# Patient Record
Sex: FEMALE | Race: BLACK OR AFRICAN AMERICAN | NOT HISPANIC OR LATINO | URBAN - METROPOLITAN AREA
[De-identification: names, ages, dates, MRNs, and addresses within clinical notes are randomized per-mention and may not be internally consistent; named-entity substitution may affect disease eponyms.]

---

## 2023-04-27 DIAGNOSIS — I10 PRIMARY HYPERTENSION: Primary | ICD-10-CM

## 2023-04-27 RX ORDER — PROPRANOLOL HYDROCHLORIDE 80 MG/1
80 TABLET ORAL DAILY
COMMUNITY
Start: 2020-12-05 | End: 2023-04-27 | Stop reason: SDUPTHER

## 2023-04-27 RX ORDER — PROPRANOLOL HYDROCHLORIDE 80 MG/1
80 TABLET ORAL DAILY
Qty: 30 TABLET | Refills: 0 | Status: SHIPPED | OUTPATIENT
Start: 2023-04-27

## 2023-09-01 ENCOUNTER — LAB (OUTPATIENT)
Dept: LAB | Facility: LAB | Age: 53
End: 2023-09-01
Payer: COMMERCIAL

## 2023-09-01 LAB
ALANINE AMINOTRANSFERASE (SGPT) (U/L) IN SER/PLAS: 12 U/L (ref 7–45)
ALBUMIN (G/DL) IN SER/PLAS: 4.1 G/DL (ref 3.4–5)
ALKALINE PHOSPHATASE (U/L) IN SER/PLAS: 70 U/L (ref 33–110)
ANION GAP IN SER/PLAS: 15 MMOL/L (ref 10–20)
ASPARTATE AMINOTRANSFERASE (SGOT) (U/L) IN SER/PLAS: 23 U/L (ref 9–39)
BASOPHILS (10*3/UL) IN BLOOD BY AUTOMATED COUNT: 0.02 X10E9/L (ref 0–0.1)
BASOPHILS/100 LEUKOCYTES IN BLOOD BY AUTOMATED COUNT: 0.6 % (ref 0–2)
BILIRUBIN TOTAL (MG/DL) IN SER/PLAS: 0.5 MG/DL (ref 0–1.2)
CALCIUM (MG/DL) IN SER/PLAS: 10.3 MG/DL (ref 8.6–10.6)
CARBON DIOXIDE, TOTAL (MMOL/L) IN SER/PLAS: 25 MMOL/L (ref 21–32)
CHLORIDE (MMOL/L) IN SER/PLAS: 107 MMOL/L (ref 98–107)
CHOLESTEROL (MG/DL) IN SER/PLAS: 213 MG/DL (ref 0–199)
CHOLESTEROL IN HDL (MG/DL) IN SER/PLAS: 72.2 MG/DL
CHOLESTEROL/HDL RATIO: 3
CREATININE (MG/DL) IN SER/PLAS: 1.01 MG/DL (ref 0.5–1.05)
EOSINOPHILS (10*3/UL) IN BLOOD BY AUTOMATED COUNT: 0.1 X10E9/L (ref 0–0.7)
EOSINOPHILS/100 LEUKOCYTES IN BLOOD BY AUTOMATED COUNT: 3 % (ref 0–6)
ERYTHROCYTE DISTRIBUTION WIDTH (RATIO) BY AUTOMATED COUNT: 13.7 % (ref 11.5–14.5)
ERYTHROCYTE MEAN CORPUSCULAR HEMOGLOBIN CONCENTRATION (G/DL) BY AUTOMATED: 32.2 G/DL (ref 32–36)
ERYTHROCYTE MEAN CORPUSCULAR VOLUME (FL) BY AUTOMATED COUNT: 90 FL (ref 80–100)
ERYTHROCYTES (10*6/UL) IN BLOOD BY AUTOMATED COUNT: 4.58 X10E12/L (ref 4–5.2)
ESTIMATED AVERAGE GLUCOSE FOR HBA1C: 111 MG/DL
GFR FEMALE: 67 ML/MIN/1.73M2
GLUCOSE (MG/DL) IN SER/PLAS: 85 MG/DL (ref 74–99)
HEMATOCRIT (%) IN BLOOD BY AUTOMATED COUNT: 41.3 % (ref 36–46)
HEMOGLOBIN (G/DL) IN BLOOD: 13.3 G/DL (ref 12–16)
HEMOGLOBIN A1C/HEMOGLOBIN TOTAL IN BLOOD: 5.5 %
IMMATURE GRANULOCYTES/100 LEUKOCYTES IN BLOOD BY AUTOMATED COUNT: 0.3 % (ref 0–0.9)
LDL: 121 MG/DL (ref 0–99)
LEUKOCYTES (10*3/UL) IN BLOOD BY AUTOMATED COUNT: 3.3 X10E9/L (ref 4.4–11.3)
LYMPHOCYTES (10*3/UL) IN BLOOD BY AUTOMATED COUNT: 1.98 X10E9/L (ref 1.2–4.8)
LYMPHOCYTES/100 LEUKOCYTES IN BLOOD BY AUTOMATED COUNT: 59.8 % (ref 13–44)
MAGNESIUM (MG/DL) IN SER/PLAS: 1.83 MG/DL (ref 1.6–2.4)
MONOCYTES (10*3/UL) IN BLOOD BY AUTOMATED COUNT: 0.4 X10E9/L (ref 0.1–1)
MONOCYTES/100 LEUKOCYTES IN BLOOD BY AUTOMATED COUNT: 12.1 % (ref 2–10)
NEUTROPHILS (10*3/UL) IN BLOOD BY AUTOMATED COUNT: 0.8 X10E9/L (ref 1.2–7.7)
NEUTROPHILS/100 LEUKOCYTES IN BLOOD BY AUTOMATED COUNT: 24.2 % (ref 40–80)
NRBC (PER 100 WBCS) BY AUTOMATED COUNT: 0 /100 WBC (ref 0–0)
PARATHYRIN INTACT (PG/ML) IN SER/PLAS: 176.7 PG/ML (ref 18.5–88)
PHOSPHATE (MG/DL) IN SER/PLAS: 3.6 MG/DL (ref 2.5–4.9)
PLATELETS (10*3/UL) IN BLOOD AUTOMATED COUNT: 154 X10E9/L (ref 150–450)
POC CALCIUM IONIZED (MMOL/L) IN BLOOD: 1.29 MMOL/L (ref 1.1–1.33)
POTASSIUM (MMOL/L) IN SER/PLAS: 3.9 MMOL/L (ref 3.5–5.3)
PROTEIN TOTAL: 7.2 G/DL (ref 6.4–8.2)
RBC MORPHOLOGY IN BLOOD: NORMAL
SODIUM (MMOL/L) IN SER/PLAS: 143 MMOL/L (ref 136–145)
THYROTROPIN (MIU/L) IN SER/PLAS BY DETECTION LIMIT <= 0.05 MIU/L: 0.95 MIU/L (ref 0.44–3.98)
TRIGLYCERIDE (MG/DL) IN SER/PLAS: 100 MG/DL (ref 0–149)
UREA NITROGEN (MG/DL) IN SER/PLAS: 11 MG/DL (ref 6–23)
VLDL: 20 MG/DL (ref 0–40)

## 2023-09-07 LAB — VITAMIN D 1,25-DIHYDROXY: 102 PG/ML (ref 19.9–79.3)

## 2023-09-22 NOTE — TELEPHONE ENCOUNTER
Rep from [facility] requesting eliquis 2.5mg, 90 day supply. Order pended and routed to provider.

## 2023-10-03 ENCOUNTER — TELEPHONE (OUTPATIENT)
Dept: PRIMARY CARE | Facility: CLINIC | Age: 53
End: 2023-10-03
Payer: COMMERCIAL

## 2023-10-03 NOTE — TELEPHONE ENCOUNTER
Call placed to pt to inform need to schedule appt to establish care with a provider for refills d/t not being seen since 2021 in .

## 2023-10-31 PROBLEM — H02.889 MGD (MEIBOMIAN GLAND DISEASE): Status: ACTIVE | Noted: 2023-10-31

## 2023-10-31 PROBLEM — N94.89 ADNEXAL MASS: Status: ACTIVE | Noted: 2023-10-31

## 2023-10-31 PROBLEM — N83.00 OVARIAN FOLLICULAR CYST: Status: ACTIVE | Noted: 2023-10-31

## 2023-10-31 PROBLEM — F41.9 INSOMNIA SECONDARY TO ANXIETY: Status: ACTIVE | Noted: 2021-06-10

## 2023-10-31 PROBLEM — G89.29 CHRONIC PELVIC PAIN IN FEMALE: Status: ACTIVE | Noted: 2023-10-31

## 2023-10-31 PROBLEM — H25.13 AGE-RELATED NUCLEAR CATARACT OF BOTH EYES: Status: ACTIVE | Noted: 2023-10-31

## 2023-10-31 PROBLEM — G43.119 INTRACTABLE MIGRAINE WITH AURA WITHOUT STATUS MIGRAINOSUS: Status: ACTIVE | Noted: 2023-10-31

## 2023-10-31 PROBLEM — I10 ESSENTIAL HYPERTENSION: Status: ACTIVE | Noted: 2023-10-31

## 2023-10-31 PROBLEM — N93.9 ABNORMAL UTERINE BLEEDING: Status: ACTIVE | Noted: 2023-10-31

## 2023-10-31 PROBLEM — D48.5 NEOPLASM OF UNCERTAIN BEHAVIOR OF SKIN: Status: ACTIVE | Noted: 2020-11-02

## 2023-10-31 PROBLEM — Z30.9 CONTRACEPTIVE MANAGEMENT: Status: ACTIVE | Noted: 2023-10-31

## 2023-10-31 PROBLEM — G89.29 BACK PAIN, CHRONIC: Status: ACTIVE | Noted: 2023-10-31

## 2023-10-31 PROBLEM — B00.9 HERPES: Status: ACTIVE | Noted: 2023-10-31

## 2023-10-31 PROBLEM — Z86.59 HISTORY OF POSTTRAUMATIC STRESS DISORDER (PTSD): Status: ACTIVE | Noted: 2023-04-25

## 2023-10-31 PROBLEM — E21.3 HYPERPARATHYROIDISM (MULTI): Status: ACTIVE | Noted: 2023-10-31

## 2023-10-31 PROBLEM — M79.7 FIBROMYALGIA: Status: ACTIVE | Noted: 2023-10-31

## 2023-10-31 PROBLEM — F41.1 GENERALIZED ANXIETY DISORDER: Status: ACTIVE | Noted: 2021-05-06

## 2023-10-31 PROBLEM — D68.61 ANTIPHOSPHOLIPID ANTIBODY SYNDROME (MULTI): Status: ACTIVE | Noted: 2023-10-31

## 2023-10-31 PROBLEM — E55.9 VITAMIN D DEFICIENCY: Status: ACTIVE | Noted: 2023-10-31

## 2023-10-31 PROBLEM — D25.9 FIBROID UTERUS: Status: ACTIVE | Noted: 2023-10-31

## 2023-10-31 PROBLEM — F40.10 SOCIAL ANXIETY DISORDER: Status: ACTIVE | Noted: 2023-07-18

## 2023-10-31 PROBLEM — H52.02 HYPEROPIA WITH PRESBYOPIA OF LEFT EYE: Status: ACTIVE | Noted: 2023-10-31

## 2023-10-31 PROBLEM — H52.4 HYPEROPIA WITH REGULAR ASTIGMATISM AND PRESBYOPIA, BILATERAL: Status: ACTIVE | Noted: 2023-10-31

## 2023-10-31 PROBLEM — D25.9 UTERINE LEIOMYOMA: Status: ACTIVE | Noted: 2023-10-31

## 2023-10-31 PROBLEM — E66.812 OBESITY, CLASS II, BMI 35-39.9: Status: ACTIVE | Noted: 2019-02-05

## 2023-10-31 PROBLEM — N91.2 AMENORRHEA, UNSPECIFIED: Status: ACTIVE | Noted: 2023-10-31

## 2023-10-31 PROBLEM — H40.039 ANATOMICAL NARROW ANGLE: Status: ACTIVE | Noted: 2023-10-31

## 2023-10-31 PROBLEM — H52.03 HYPEROPIA WITH REGULAR ASTIGMATISM AND PRESBYOPIA, BILATERAL: Status: ACTIVE | Noted: 2023-10-31

## 2023-10-31 PROBLEM — H04.129 DRY EYE SYNDROME: Status: ACTIVE | Noted: 2023-10-31

## 2023-10-31 PROBLEM — M62.89 PELVIC FLOOR DYSFUNCTION: Status: ACTIVE | Noted: 2023-10-31

## 2023-10-31 PROBLEM — M32.9 LUPUS (SYSTEMIC LUPUS ERYTHEMATOSUS) (MULTI): Status: ACTIVE | Noted: 2023-10-31

## 2023-10-31 PROBLEM — E66.9 OBESITY, CLASS II, BMI 35-39.9: Status: ACTIVE | Noted: 2019-02-05

## 2023-10-31 PROBLEM — F33.0 MILD EPISODE OF RECURRENT MAJOR DEPRESSIVE DISORDER (CMS-HCC): Chronic | Status: ACTIVE | Noted: 2021-05-06

## 2023-10-31 PROBLEM — H52.03 HYPEROPIA OF BOTH EYES: Status: ACTIVE | Noted: 2023-10-31

## 2023-10-31 PROBLEM — H52.4 HYPEROPIA WITH PRESBYOPIA OF LEFT EYE: Status: ACTIVE | Noted: 2023-10-31

## 2023-10-31 PROBLEM — R10.84 GENERALIZED ABDOMINAL PAIN: Status: ACTIVE | Noted: 2023-10-31

## 2023-10-31 PROBLEM — M32.9 SYSTEMIC LUPUS ERYTHEMATOSUS (MULTI): Status: ACTIVE | Noted: 2023-10-31

## 2023-10-31 PROBLEM — M54.9 BACK PAIN, CHRONIC: Status: ACTIVE | Noted: 2023-10-31

## 2023-10-31 PROBLEM — F32.A DEPRESSION: Status: ACTIVE | Noted: 2023-10-31

## 2023-10-31 PROBLEM — H52.223 HYPEROPIA WITH REGULAR ASTIGMATISM AND PRESBYOPIA, BILATERAL: Status: ACTIVE | Noted: 2023-10-31

## 2023-10-31 PROBLEM — E83.52 SERUM CALCIUM ELEVATED: Status: ACTIVE | Noted: 2023-10-31

## 2023-10-31 PROBLEM — R87.610 PAP SMEAR ABNORMALITY OF CERVIX WITH ASCUS FAVORING BENIGN: Status: ACTIVE | Noted: 2023-10-31

## 2023-10-31 PROBLEM — G47.00 INSOMNIA: Status: ACTIVE | Noted: 2023-10-31

## 2023-10-31 PROBLEM — R10.2 CHRONIC PELVIC PAIN IN FEMALE: Status: ACTIVE | Noted: 2023-10-31

## 2023-10-31 PROBLEM — E66.9 OBESITY: Status: ACTIVE | Noted: 2023-10-31

## 2023-10-31 PROBLEM — N92.3 INTERMENSTRUAL BLEEDING: Status: ACTIVE | Noted: 2023-10-31

## 2023-10-31 PROBLEM — R80.9 PROTEINURIA: Status: ACTIVE | Noted: 2023-10-31

## 2023-10-31 PROBLEM — F51.05 INSOMNIA SECONDARY TO ANXIETY: Status: ACTIVE | Noted: 2021-06-10

## 2023-10-31 RX ORDER — HYDROXYZINE HYDROCHLORIDE 25 MG/1
1 TABLET, FILM COATED ORAL EVERY 12 HOURS PRN
COMMUNITY
Start: 2021-03-05

## 2023-10-31 RX ORDER — ONDANSETRON 4 MG/1
1 TABLET, FILM COATED ORAL EVERY 8 HOURS PRN
COMMUNITY
Start: 2016-12-28

## 2023-10-31 RX ORDER — HYDROXYCHLOROQUINE SULFATE 200 MG/1
200 TABLET, FILM COATED ORAL DAILY
COMMUNITY
End: 2024-05-01 | Stop reason: SDUPTHER

## 2023-10-31 RX ORDER — PREDNISONE 1 MG/1
2 TABLET ORAL
COMMUNITY
Start: 2017-07-27

## 2023-10-31 RX ORDER — LOSARTAN POTASSIUM 25 MG/1
1 TABLET ORAL DAILY
COMMUNITY
Start: 2016-08-12

## 2023-10-31 RX ORDER — MULTIVITAMIN
TABLET ORAL
COMMUNITY
Start: 2023-09-08

## 2023-10-31 RX ORDER — OMEPRAZOLE 20 MG/1
1 CAPSULE, DELAYED RELEASE ORAL DAILY
COMMUNITY
Start: 2017-08-15 | End: 2024-04-09 | Stop reason: SDUPTHER

## 2023-10-31 RX ORDER — CYCLOBENZAPRINE HCL 10 MG
1 TABLET ORAL NIGHTLY PRN
COMMUNITY
Start: 2018-03-28

## 2023-10-31 RX ORDER — MULTIVITAMIN/IRON/FOLIC ACID 18MG-0.4MG
1 TABLET ORAL DAILY
COMMUNITY
Start: 2021-03-05

## 2023-10-31 RX ORDER — ERGOCALCIFEROL 1.25 MG/1
1 CAPSULE ORAL
COMMUNITY
Start: 2022-03-03

## 2023-10-31 RX ORDER — ESCITALOPRAM OXALATE 10 MG/1
TABLET ORAL
COMMUNITY
Start: 2023-03-14

## 2023-10-31 RX ORDER — ARIPIPRAZOLE 10 MG/1
1 TABLET ORAL DAILY
COMMUNITY
Start: 2023-09-15

## 2023-10-31 RX ORDER — CITALOPRAM 40 MG/1
1 TABLET, FILM COATED ORAL DAILY
COMMUNITY
Start: 2013-10-08

## 2023-10-31 RX ORDER — DEXTROMETHORPHAN HYDROBROMIDE, GUAIFENESIN 5; 100 MG/5ML; MG/5ML
1 LIQUID ORAL
COMMUNITY
Start: 2018-09-24

## 2023-10-31 RX ORDER — CYCLOBENZAPRINE HCL 5 MG
1 TABLET ORAL DAILY PRN
COMMUNITY

## 2023-10-31 RX ORDER — TALC
2 POWDER (GRAM) TOPICAL NIGHTLY
COMMUNITY
Start: 2023-08-18

## 2023-10-31 RX ORDER — UBIDECARENONE 30 MG
1 CAPSULE ORAL DAILY
COMMUNITY
Start: 2018-06-09

## 2023-10-31 RX ORDER — ACETAMINOPHEN 325 MG/1
1-2 TABLET ORAL EVERY 4 HOURS PRN
COMMUNITY
Start: 2016-04-11

## 2023-10-31 RX ORDER — NORETHINDRONE 0.35 MG/1
1 TABLET ORAL DAILY
COMMUNITY

## 2023-10-31 RX ORDER — CITALOPRAM 20 MG/1
1 TABLET, FILM COATED ORAL DAILY
COMMUNITY
Start: 2019-02-05

## 2023-11-01 ENCOUNTER — OFFICE VISIT (OUTPATIENT)
Dept: OBSTETRICS AND GYNECOLOGY | Facility: HOSPITAL | Age: 53
End: 2023-11-01
Payer: COMMERCIAL

## 2023-11-01 VITALS
WEIGHT: 229 LBS | SYSTOLIC BLOOD PRESSURE: 120 MMHG | HEART RATE: 78 BPM | DIASTOLIC BLOOD PRESSURE: 79 MMHG | BODY MASS INDEX: 40.57 KG/M2 | HEIGHT: 63 IN

## 2023-11-01 DIAGNOSIS — Z01.419 WELL WOMAN EXAM: Primary | ICD-10-CM

## 2023-11-01 PROCEDURE — 99386 PREV VISIT NEW AGE 40-64: CPT

## 2023-11-01 PROCEDURE — 3074F SYST BP LT 130 MM HG: CPT

## 2023-11-01 PROCEDURE — 1036F TOBACCO NON-USER: CPT

## 2023-11-01 PROCEDURE — 3078F DIAST BP <80 MM HG: CPT

## 2023-11-01 SDOH — ECONOMIC STABILITY: FOOD INSECURITY: WITHIN THE PAST 12 MONTHS, THE FOOD YOU BOUGHT JUST DIDN'T LAST AND YOU DIDN'T HAVE MONEY TO GET MORE.: NEVER TRUE

## 2023-11-01 SDOH — ECONOMIC STABILITY: FOOD INSECURITY: WITHIN THE PAST 12 MONTHS, YOU WORRIED THAT YOUR FOOD WOULD RUN OUT BEFORE YOU GOT MONEY TO BUY MORE.: NEVER TRUE

## 2023-11-01 ASSESSMENT — PATIENT HEALTH QUESTIONNAIRE - PHQ9
SUM OF ALL RESPONSES TO PHQ9 QUESTIONS 1 & 2: 0
1. LITTLE INTEREST OR PLEASURE IN DOING THINGS: NOT AT ALL
2. FEELING DOWN, DEPRESSED OR HOPELESS: NOT AT ALL

## 2023-11-01 ASSESSMENT — ENCOUNTER SYMPTOMS
CONSTITUTIONAL NEGATIVE: 0
NEUROLOGICAL NEGATIVE: 0
MUSCULOSKELETAL NEGATIVE: 0
ALLERGIC/IMMUNOLOGIC NEGATIVE: 0
RESPIRATORY NEGATIVE: 0
GASTROINTESTINAL NEGATIVE: 0
HEMATOLOGIC/LYMPHATIC NEGATIVE: 0
CARDIOVASCULAR NEGATIVE: 0
PSYCHIATRIC NEGATIVE: 0
EYES NEGATIVE: 0
ENDOCRINE NEGATIVE: 0

## 2023-11-01 NOTE — PROGRESS NOTES
"Assessment/Plan   Problem List Items Addressed This Visit             ICD-10-CM    Well woman exam - Primary Z01.419     Well woman exam completed; PAP due in 2025         Relevant Orders    Follow Up In Gynecology       LEATHA Guerrero   Desire Dailey is a 53 y.o. female who presents for annual exam.     Concerns today:  No concerns    GYN screening history: last pap: approximate date 10/2020 and was normal and last mammogram: approximate date 9/2023 and was normal. The patient is not taking hormone replacement therapy. Patient denies post-menopausal vaginal bleeding..  The patient participates in regular exercise: not asked. The patient reports that there is not domestic violence in their life.     Last pap: 2020  History of abnormal Pap smear: no  Family history of uterine or ovarian cancer: no    Last mammogram: 2023  History of abnormal mammogram: no  Family history of breast cancer: yes - sister    Menstrual History:  OB History    No obstetric history on file.          No LMP recorded (lmp unknown). Patient is perimenopausal.         Review of Systems   All other systems reviewed and are negative.       Objective   /79 (BP Location: Right arm, Patient Position: Sitting, BP Cuff Size: Adult)   Pulse 78   Ht 1.6 m (5' 3\")   Wt 104 kg (229 lb)   LMP  (LMP Unknown) Comment: 3 years  BMI 40.57 kg/m²     Physical Exam  Constitutional:       Appearance: Normal appearance.   HENT:      Head: Normocephalic and atraumatic.      Mouth/Throat:      Mouth: Mucous membranes are moist.   Cardiovascular:      Rate and Rhythm: Normal rate and regular rhythm.      Heart sounds: Normal heart sounds.   Pulmonary:      Effort: Pulmonary effort is normal.      Breath sounds: Normal breath sounds.   Abdominal:      General: Abdomen is flat.      Palpations: Abdomen is soft.   Musculoskeletal:         General: Normal range of motion.      Cervical back: Normal range of motion and neck supple. "   Skin:     General: Skin is warm and dry.   Neurological:      Mental Status: She is alert and oriented to person, place, and time.   Psychiatric:         Mood and Affect: Mood normal.         Behavior: Behavior normal.         Thought Content: Thought content normal.         Judgment: Judgment normal.

## 2023-11-28 ENCOUNTER — OFFICE VISIT (OUTPATIENT)
Dept: PRIMARY CARE | Facility: CLINIC | Age: 53
End: 2023-11-28
Payer: COMMERCIAL

## 2023-11-28 VITALS
TEMPERATURE: 98.2 F | HEIGHT: 63 IN | HEART RATE: 99 BPM | RESPIRATION RATE: 18 BRPM | WEIGHT: 226.3 LBS | BODY MASS INDEX: 40.1 KG/M2 | SYSTOLIC BLOOD PRESSURE: 115 MMHG | DIASTOLIC BLOOD PRESSURE: 84 MMHG | OXYGEN SATURATION: 97 %

## 2023-11-28 DIAGNOSIS — Z86.718 HISTORY OF DEEP VEIN THROMBOSIS: Primary | ICD-10-CM

## 2023-11-28 PROCEDURE — 99396 PREV VISIT EST AGE 40-64: CPT | Performed by: STUDENT IN AN ORGANIZED HEALTH CARE EDUCATION/TRAINING PROGRAM

## 2023-11-28 PROCEDURE — 1036F TOBACCO NON-USER: CPT | Performed by: STUDENT IN AN ORGANIZED HEALTH CARE EDUCATION/TRAINING PROGRAM

## 2023-11-28 PROCEDURE — 3074F SYST BP LT 130 MM HG: CPT | Performed by: STUDENT IN AN ORGANIZED HEALTH CARE EDUCATION/TRAINING PROGRAM

## 2023-11-28 PROCEDURE — 3079F DIAST BP 80-89 MM HG: CPT | Performed by: STUDENT IN AN ORGANIZED HEALTH CARE EDUCATION/TRAINING PROGRAM

## 2023-11-28 ASSESSMENT — PAIN SCALES - GENERAL: PAINLEVEL: 0-NO PAIN

## 2023-11-28 NOTE — PROGRESS NOTES
I reviewed the resident/fellow's documentation and discussed the patient with the resident/fellow. I agree with the resident/fellow's medical decision making as documented in the note.    Kerwin Harrington MD
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
## 2023-12-22 ENCOUNTER — TELEPHONE (OUTPATIENT)
Dept: PRIMARY CARE | Facility: CLINIC | Age: 53
End: 2023-12-22
Payer: COMMERCIAL

## 2023-12-27 ENCOUNTER — TELEPHONE (OUTPATIENT)
Dept: RHEUMATOLOGY | Facility: CLINIC | Age: 53
End: 2023-12-27
Payer: COMMERCIAL

## 2023-12-27 DIAGNOSIS — R79.89 ELEVATED PTHRP LEVEL: ICD-10-CM

## 2023-12-27 DIAGNOSIS — E55.9 VITAMIN D DEFICIENCY: Primary | ICD-10-CM

## 2023-12-27 NOTE — TELEPHONE ENCOUNTER
Pt called stating the last time she saw you she was given a referral. Unsure of what the referral is for but she has misplaced it and would like a new one mailed. I was unable to find a referral in her chart.

## 2024-01-11 ENCOUNTER — LAB (OUTPATIENT)
Dept: LAB | Facility: LAB | Age: 54
End: 2024-01-11
Payer: COMMERCIAL

## 2024-01-11 DIAGNOSIS — E55.9 VITAMIN D DEFICIENCY: ICD-10-CM

## 2024-01-11 DIAGNOSIS — R79.89 ELEVATED PTHRP LEVEL: ICD-10-CM

## 2024-01-11 LAB
25(OH)D3 SERPL-MCNC: 36 NG/ML (ref 30–100)
PTH-INTACT SERPL-MCNC: 174.6 PG/ML (ref 18.5–88)

## 2024-01-11 PROCEDURE — 82306 VITAMIN D 25 HYDROXY: CPT

## 2024-01-11 PROCEDURE — 83970 ASSAY OF PARATHORMONE: CPT

## 2024-01-11 PROCEDURE — 36415 COLL VENOUS BLD VENIPUNCTURE: CPT

## 2024-01-12 DIAGNOSIS — E21.3 HYPERPARATHYROIDISM (MULTI): Primary | ICD-10-CM

## 2024-01-12 DIAGNOSIS — E66.01 CLASS 3 SEVERE OBESITY DUE TO EXCESS CALORIES WITH SERIOUS COMORBIDITY AND BODY MASS INDEX (BMI) OF 40.0 TO 44.9 IN ADULT (MULTI): Primary | ICD-10-CM

## 2024-02-08 NOTE — PROGRESS NOTES
Subjective   Patient ID: Desire Dailey is a 53 y.o. female who presents for Lupus (FUV- pt states has increased pain level and has notice some hairloss. Taking Plaquenil 200 mg Bid.).    HPI  Last seen one year ago 2/3/2023  Dr. Frances former Rheum     · Hyperparathyroidism (252.00) (E21.3)   · Antiphospholipid antibody syndrome (289.81) (D68.61)   · Lupus (systemic lupus erythematosus) (710.0) (M32.9)   · Long-term use of Plaquenil (V58.69) (Z79.899)    Referral to Endo last year never went    Eye exam done 2023      Current Outpatient Medications   Medication Sig Dispense Refill    acetaminophen (Tylenol 8 HOUR) 650 mg ER tablet Take 1 tablet (650 mg) by mouth. 3-4 TIMES DAILY      apixaban (Eliquis) 2.5 mg tablet Take 1 tablet (2.5 mg) by mouth 2 times a day. 120 tablet 3    ARIPiprazole (Abilify) 10 mg tablet Take 1 tablet (10 mg) by mouth once daily.      Certavite-Antioxidant  mg-mcg tablet Take 1 tablet by mouth once daily.      citalopram (CeleXA) 40 mg tablet Take 1 tablet (40 mg) by mouth once daily.      escitalopram (Lexapro) 10 mg tablet       hydroxychloroquine (Plaquenil) 200 mg tablet Take 1 tablet (200 mg) by mouth once daily. Takes one tablet in morning and one tablet at night.      hydrOXYzine HCL (Atarax) 25 mg tablet Take 1 tablet (25 mg) by mouth every 12 hours if needed.      losartan (Cozaar) 25 mg tablet Take 1 tablet (25 mg) by mouth once daily.      melatonin 3 mg tablet Take 2 tablets (6 mg) by mouth once daily at bedtime.      omeprazole (PriLOSEC) 20 mg DR capsule Take 1 capsule (20 mg) by mouth once daily.      ondansetron (Zofran) 4 mg tablet Take 1 tablet (4 mg) by mouth every 8 hours if needed for nausea.      propranolol (Inderal) 80 mg tablet Take 1 tablet (80 mg) by mouth once daily. 30 tablet 0    acetaminophen (TylenoL) 325 mg tablet Take 1-2 tablets (325-650 mg) by mouth every 4 hours if needed.      citalopram (CeleXA) 20 mg tablet Take 1 tablet (20 mg) by mouth once  daily. Takes 30 mg at night      cyclobenzaprine (Flexeril) 10 mg tablet Take 1 tablet (10 mg) by mouth as needed at bedtime for muscle spasms.      cyclobenzaprine (Flexeril) 5 mg tablet Take 1 tablet (5 mg) by mouth once daily as needed.      ergocalciferol (Vitamin D-2) 1.25 MG (98363 UT) capsule Take 1 capsule (1,250 mcg) by mouth 1 (one) time per week. X 3 WEEKS THEN ONCE EVERY OTHER WEEK      multivitamin tablet       mv-calcium-min-iron fm-FA-vitK (Multi For Her) 18 mg iron-600 mcg-80 mcg tablet Take 1 tablet by mouth once daily.      norethindrone (Lia) 0.35 mg tablet Take 1 tablet (0.35 mg) by mouth once daily.      predniSONE (Deltasone) 1 mg tablet Take 2 tablets (2 mg) by mouth once daily in the morning. Take before meals.      predniSONE 5 mg tablet,delayed release (DR/EC) Take 1 tablet by mouth once daily.      RANITIDINE HCL ORAL Take by mouth once daily in the morning.       No current facility-administered medications for this visit.         has a past medical history of Acute embolism and thrombosis of unspecified deep veins of unspecified lower extremity (CMS/[facility]) (08/25/2014), Acute embolism and thrombosis of unspecified deep veins of unspecified lower extremity (CMS/[facility]) (07/08/2020), Dry eye syndrome of unspecified lacrimal gland (08/03/2017), Encounter for screening for infections with a predominantly sexual mode of transmission (10/06/2020), Injury of conjunctiva and corneal abrasion without foreign body, left eye, initial encounter (08/24/2015), Long term (current) use of anticoagulants (12/16/2014), Meibomian gland dysfunction of unspecified eye, unspecified eyelid (08/03/2017), Other long term (current) drug therapy (03/02/2022), Personal history of other complications of pregnancy, childbirth and the puerperium, Personal history of other specified conditions, Systemic lupus erythematosus, unspecified (CMS/[facility]) (03/28/2022), Unspecified cataract (05/12/2015), Unspecified cataract  (05/12/2015), Unspecified cataract (05/12/2015), and Unspecified cataract (05/12/2015).   Past Surgical History:   Procedure Laterality Date    FOOT SURGERY  06/02/2016    Foot Surgery Right    IR ANGIOGRAM INFERIOR EPIGASTRIC  12/27/2016    IR ANGIOGRAM INFERIOR EPIGASTRIC 12/27/2016 [facility]    IR ANGIOGRAM INFERIOR EPIGASTRIC  12/27/2016    IR ANGIOGRAM INFERIOR EPIGASTRIC 12/27/2016 [facility]    OTHER SURGICAL HISTORY  01/18/2019    Laparotomy    OTHER SURGICAL HISTORY  06/02/2016    Laparoscopy With Excision Of Ectopic Pregnancy      Social History     Tobacco Use    Smoking status: Never    Smokeless tobacco: Never   Vaping Use    Vaping Use: Never used   Substance Use Topics    Alcohol use: Never      family history includes Angina in her sister; Cerebral aneurysm in her mother; Depression in her mother; Diabetes in her father, mother's sister, and sister; Heart attack in her mother; Hyperlipidemia in her mother; Hypertension in her mother, mother's sister, and sister; Migraines in her mother; Pancreatic cancer in her father; Stroke in her mother and sister; Uterine cancer in her sister.  Pain Management Panel           No data to display                 There were no vitals filed for this visit.  Lab Results   Component Value Date    TSH 0.95 09/01/2023       PHYSICAL EXAM    Some increased   NODES   HEART-  LUNGS-  ABDOMEN   VASCULAR  NEURO -  SKIN-  JOINTS -    PLAN  Diagnoses and all orders for this visit:  Antiphospholipid antibody syndrome (CMS/HCC)  Hyperparathyroidism (CMS/HCC)  Vitamin D deficiency  Systemic lupus erythematosus, unspecified SLE type, unspecified organ involvement status (CMS/HCC)    Patient is doing very well  Some minimal hair loss  Some muscle pain but not joint pain she has had this before and is responding to Flexeril as needed    Lab work continues to show elevated PTH with recent vitamin D at 36.  I will try again to refer to endocrinology if she did not keep her  appointment last year    She has a new PCP at [facility] although it is not listed on epic.  She had recent blood work in January 2024.  She does not need any blood work regarding the conditions that I see her for.  I will see her back in 1 year

## 2024-02-09 ENCOUNTER — OFFICE VISIT (OUTPATIENT)
Dept: RHEUMATOLOGY | Facility: CLINIC | Age: 54
End: 2024-02-09
Payer: COMMERCIAL

## 2024-02-09 VITALS
DIASTOLIC BLOOD PRESSURE: 87 MMHG | WEIGHT: 227 LBS | BODY MASS INDEX: 40.21 KG/M2 | HEART RATE: 53 BPM | SYSTOLIC BLOOD PRESSURE: 123 MMHG

## 2024-02-09 DIAGNOSIS — M79.10 MYALGIA: ICD-10-CM

## 2024-02-09 DIAGNOSIS — M32.9 SYSTEMIC LUPUS ERYTHEMATOSUS, UNSPECIFIED SLE TYPE, UNSPECIFIED ORGAN INVOLVEMENT STATUS (MULTI): ICD-10-CM

## 2024-02-09 DIAGNOSIS — E55.9 VITAMIN D DEFICIENCY: ICD-10-CM

## 2024-02-09 DIAGNOSIS — E21.3 HYPERPARATHYROIDISM (MULTI): ICD-10-CM

## 2024-02-09 DIAGNOSIS — D68.61 ANTIPHOSPHOLIPID ANTIBODY SYNDROME (MULTI): Primary | ICD-10-CM

## 2024-02-09 PROCEDURE — 1036F TOBACCO NON-USER: CPT | Performed by: INTERNAL MEDICINE

## 2024-02-09 PROCEDURE — 3008F BODY MASS INDEX DOCD: CPT | Performed by: INTERNAL MEDICINE

## 2024-02-09 PROCEDURE — 3074F SYST BP LT 130 MM HG: CPT | Performed by: INTERNAL MEDICINE

## 2024-02-09 PROCEDURE — 3079F DIAST BP 80-89 MM HG: CPT | Performed by: INTERNAL MEDICINE

## 2024-02-09 PROCEDURE — 99214 OFFICE O/P EST MOD 30 MIN: CPT | Performed by: INTERNAL MEDICINE

## 2024-02-09 RX ORDER — CYCLOBENZAPRINE HCL 10 MG
10 TABLET ORAL NIGHTLY
Qty: 30 TABLET | Refills: 0 | Status: SHIPPED | OUTPATIENT
Start: 2024-02-09 | End: 2024-03-10

## 2024-02-27 ENCOUNTER — APPOINTMENT (OUTPATIENT)
Dept: OPHTHALMOLOGY | Facility: CLINIC | Age: 54
End: 2024-02-27
Payer: COMMERCIAL

## 2024-03-11 ENCOUNTER — OFFICE VISIT (OUTPATIENT)
Dept: OPHTHALMOLOGY | Facility: CLINIC | Age: 54
End: 2024-03-11
Payer: COMMERCIAL

## 2024-03-11 DIAGNOSIS — H02.88B MEIBOMIAN GLAND DYSFUNCTION LEFT EYE, UPPER AND LOWER EYELIDS: ICD-10-CM

## 2024-03-11 DIAGNOSIS — H52.223 REGULAR ASTIGMATISM OF BOTH EYES: ICD-10-CM

## 2024-03-11 DIAGNOSIS — D68.61 ANTIPHOSPHOLIPID ANTIBODY SYNDROME (MULTI): ICD-10-CM

## 2024-03-11 DIAGNOSIS — H52.4 PRESBYOPIA: ICD-10-CM

## 2024-03-11 DIAGNOSIS — H52.03 HYPEROPIA OF BOTH EYES: Primary | ICD-10-CM

## 2024-03-11 DIAGNOSIS — H40.039 ANATOMICAL NARROW ANGLE: ICD-10-CM

## 2024-03-11 DIAGNOSIS — Z79.899 LONG-TERM USE OF PLAQUENIL: ICD-10-CM

## 2024-03-11 DIAGNOSIS — M32.14 SLE GLOMERULONEPHRITIS SYNDROME (MULTI): Chronic | ICD-10-CM

## 2024-03-11 DIAGNOSIS — H02.88A MEIBOMIAN GLAND DYSFUNCTION RIGHT EYE, UPPER AND LOWER EYELIDS: ICD-10-CM

## 2024-03-11 PROCEDURE — 99203 OFFICE O/P NEW LOW 30 MIN: CPT | Performed by: OPTOMETRIST

## 2024-03-11 PROCEDURE — 92020 GONIOSCOPY: CPT | Performed by: OPTOMETRIST

## 2024-03-11 PROCEDURE — 92134 CPTRZ OPH DX IMG PST SGM RTA: CPT | Mod: BILATERAL PROCEDURE | Performed by: OPTOMETRIST

## 2024-03-11 PROCEDURE — 92015 DETERMINE REFRACTIVE STATE: CPT | Performed by: OPTOMETRIST

## 2024-03-11 PROCEDURE — 92083 EXTENDED VISUAL FIELD XM: CPT | Performed by: OPTOMETRIST

## 2024-03-11 ASSESSMENT — VISUAL ACUITY
METHOD: SNELLEN - LINEAR
OS_SC: 20/40-2
OD_SC: 20/40

## 2024-03-11 ASSESSMENT — CONF VISUAL FIELD
OD_NORMAL: 1
OS_INFERIOR_TEMPORAL_RESTRICTION: 0
OD_SUPERIOR_TEMPORAL_RESTRICTION: 0
OD_SUPERIOR_NASAL_RESTRICTION: 0
OS_INFERIOR_NASAL_RESTRICTION: 0
OD_INFERIOR_TEMPORAL_RESTRICTION: 0
OS_SUPERIOR_NASAL_RESTRICTION: 0
OS_SUPERIOR_TEMPORAL_RESTRICTION: 0
OD_INFERIOR_NASAL_RESTRICTION: 0
METHOD: COUNTING FINGERS
OS_NORMAL: 1

## 2024-03-11 ASSESSMENT — ENCOUNTER SYMPTOMS
MUSCULOSKELETAL NEGATIVE: 0
CONSTITUTIONAL NEGATIVE: 0
RESPIRATORY NEGATIVE: 0
PSYCHIATRIC NEGATIVE: 0
NEUROLOGICAL NEGATIVE: 0
EYES NEGATIVE: 1
HEMATOLOGIC/LYMPHATIC NEGATIVE: 0
ENDOCRINE NEGATIVE: 0
CARDIOVASCULAR NEGATIVE: 0
ALLERGIC/IMMUNOLOGIC NEGATIVE: 0
GASTROINTESTINAL NEGATIVE: 0

## 2024-03-11 ASSESSMENT — CUP TO DISC RATIO
OS_RATIO: .4
OD_RATIO: .4

## 2024-03-11 ASSESSMENT — REFRACTION_MANIFEST
OS_AXIS: 010
OS_CYLINDER: -0.50
OS_ADD: +2.25
OD_SPHERE: +1.25
OD_CYLINDER: -0.50
OS_SPHERE: +1.25
OD_AXIS: 070
OD_ADD: +2.25

## 2024-03-11 ASSESSMENT — GONIOSCOPY
OS_INFERIOR: GR III
METHOD: 4 MIRROR
OS_SUPERIOR: GR II
OD_NASAL: GR II
OS_TEMPORAL: GR II
OD_SUPERIOR: GR II
OS_NASAL: GR II
OD_TEMPORAL: GR II
OD_INFERIOR: GR III

## 2024-03-11 ASSESSMENT — REFRACTION
OD_CYLINDER: -0.50
OD_SPHERE: +1.50
OS_AXIS: 100
OS_CYLINDER: -0.50
OS_ADD: +2.25
OD_AXIS: 080
OS_SPHERE: +1.50
OD_ADD: +2.25

## 2024-03-11 ASSESSMENT — EXTERNAL EXAM - LEFT EYE: OS_EXAM: NORMAL

## 2024-03-11 ASSESSMENT — TONOMETRY
IOP_METHOD: GOLDMANN APPLANATION
OD_IOP_MMHG: 11
OS_IOP_MMHG: 12

## 2024-03-11 ASSESSMENT — SLIT LAMP EXAM - LIDS
COMMENTS: GOOD POSITION, 3+ MGD
COMMENTS: GOOD POSITION, 3+ MGD

## 2024-03-11 ASSESSMENT — EXTERNAL EXAM - RIGHT EYE: OD_EXAM: NORMAL

## 2024-03-11 NOTE — PROGRESS NOTES
Assessment/Plan   Diagnoses and all orders for this visit:  Hyperopia of both eyes  Presbyopia  Regular astigmatism of both eyes  New spec rx released today per patient request. Ocular health wnl for age OU. Monitor 1 year or sooner prn. Refraction billed today. Discussed CLs, need to treat dry eye first. $90 eval fee, new wearer, rtc prn.    Long-term use of Plaquenil  -     OCT, Retina - OU - Both Eyes  -     [place] Visual Field - OU - Both Eyes  SLE glomerulonephritis syndrome (CMS/HCC)  Studies reveal that the risk of maculopathy when taking Plaquenil is 0% (0-5 years), 1% (over 5 years), 2% (over 10 years), and 20% cumulative, or 4% annual incidence (over 20 years) respectively.  Annual testing with 10-2 threshold visual field perimetry, as well as spectral domain optical coherence tomography of the macula is recommended. No signs of plaquenil toxicity today od/os, macula OCT today, HVF 10-2 today  monitor 1 year or sooner with any acute vision change. HVF 10-2 and OCT Mac at next comprehensive exam.    Antiphospholipid antibody syndrome (CMS/[facility])  No evidence of intraocular inflammation. Monitor.     Anatomical narrow angle  Gonio reveals open to gr III inf, gr II in all other quadrants    Meibomian gland dysfunction  Recommend hot compresses with lid massage bid OU. Recommend lid cleansing qhs OU. Discussed that this is a chronic problem which requires chronic/consistent treatment.

## 2024-04-09 DIAGNOSIS — K21.9 GASTROESOPHAGEAL REFLUX DISEASE WITHOUT ESOPHAGITIS: Primary | ICD-10-CM

## 2024-04-09 RX ORDER — OMEPRAZOLE 20 MG/1
20 CAPSULE, DELAYED RELEASE ORAL DAILY
Qty: 90 CAPSULE | Refills: 3 | Status: SHIPPED | OUTPATIENT
Start: 2024-04-09 | End: 2024-04-23 | Stop reason: SDUPTHER

## 2024-04-09 NOTE — TELEPHONE ENCOUNTER
Hakan, pharm tech of [facility] called requesting refill of pt omeprazole DR 20mg. Order pended and routed to provider.

## 2024-04-23 ENCOUNTER — TELEPHONE (OUTPATIENT)
Dept: PRIMARY CARE | Facility: CLINIC | Age: 54
End: 2024-04-23

## 2024-04-23 DIAGNOSIS — K21.9 GASTROESOPHAGEAL REFLUX DISEASE WITHOUT ESOPHAGITIS: ICD-10-CM

## 2024-04-23 RX ORDER — OMEPRAZOLE 20 MG/1
20 CAPSULE, DELAYED RELEASE ORAL DAILY
Qty: 90 CAPSULE | Refills: 3 | Status: SHIPPED | OUTPATIENT
Start: 2024-04-23

## 2024-04-23 NOTE — TELEPHONE ENCOUNTER
Rx Refill Request Telephone Encounter    Name:  Desire Dailey  :  [id number]  Medication Name:  omeprazol            Specific Pharmacy location:  [facility] [address]   Date of last appointment: 23   Date of next appointment:    Best number to reach patient:  [phone]

## 2024-05-01 DIAGNOSIS — M32.14 SLE GLOMERULONEPHRITIS SYNDROME (MULTI): Primary | Chronic | ICD-10-CM

## 2024-05-02 RX ORDER — HYDROXYCHLOROQUINE SULFATE 200 MG/1
200 TABLET, FILM COATED ORAL DAILY
Qty: 90 TABLET | Refills: 3 | Status: SHIPPED | OUTPATIENT
Start: 2024-05-02 | End: 2024-05-04 | Stop reason: SDUPTHER

## 2024-05-03 ENCOUNTER — TELEPHONE (OUTPATIENT)
Dept: RHEUMATOLOGY | Facility: CLINIC | Age: 54
End: 2024-05-03
Payer: COMMERCIAL

## 2024-05-04 DIAGNOSIS — M32.14 SLE GLOMERULONEPHRITIS SYNDROME (MULTI): Chronic | ICD-10-CM

## 2024-05-04 RX ORDER — HYDROXYCHLOROQUINE SULFATE 200 MG/1
200 TABLET, FILM COATED ORAL 2 TIMES DAILY
Qty: 90 TABLET | Refills: 3 | Status: SHIPPED | OUTPATIENT
Start: 2024-05-04

## 2024-06-12 DIAGNOSIS — I10 PRIMARY HYPERTENSION: Primary | ICD-10-CM

## 2024-06-12 RX ORDER — LOSARTAN POTASSIUM 25 MG/1
25 TABLET ORAL DAILY
Qty: 30 TABLET | Refills: 9 | Status: SHIPPED | OUTPATIENT
Start: 2024-06-12

## 2024-06-13 DIAGNOSIS — Z86.718 HISTORY OF DEEP VEIN THROMBOSIS: ICD-10-CM

## 2024-07-16 DIAGNOSIS — M79.10 MYALGIA: ICD-10-CM

## 2024-07-16 RX ORDER — CYCLOBENZAPRINE HCL 10 MG
10 TABLET ORAL NIGHTLY
Qty: 30 TABLET | Refills: 0 | Status: SHIPPED | OUTPATIENT
Start: 2024-07-16 | End: 2024-08-15

## 2024-07-16 NOTE — TELEPHONE ENCOUNTER
Prescription Request for Cyclobenzaprine         Has The Patient Been Identified By Name And Date Of Birth: Yes    RX Requestor: Pharmacy via fax    Date of Last Refill: 2/09/24    Date Of Last Office Visit: 2/09/24 Dr. Chowdhury    Date Of Future Office Visit: 2/07/25 Dr. Chowdhury

## 2024-08-11 DIAGNOSIS — M79.10 MYALGIA: ICD-10-CM

## 2024-08-12 RX ORDER — CYCLOBENZAPRINE HCL 10 MG
TABLET ORAL
Qty: 30 TABLET | Refills: 0 | Status: SHIPPED | OUTPATIENT
Start: 2024-08-12

## 2024-08-27 DIAGNOSIS — Z76.0 MEDICATION REFILL: Primary | ICD-10-CM

## 2024-08-27 RX ORDER — MULTIVITAMIN
1 TABLET ORAL DAILY
Qty: 90 TABLET | Refills: 3 | Status: SHIPPED | OUTPATIENT
Start: 2024-08-27 | End: 2025-08-27

## 2024-09-05 DIAGNOSIS — M79.10 MYALGIA: ICD-10-CM

## 2024-09-05 RX ORDER — CYCLOBENZAPRINE HCL 10 MG
TABLET ORAL
Qty: 30 TABLET | Refills: 0 | Status: SHIPPED | OUTPATIENT
Start: 2024-09-05

## 2024-09-24 ENCOUNTER — APPOINTMENT (OUTPATIENT)
Dept: PRIMARY CARE | Facility: CLINIC | Age: 54
End: 2024-09-24
Payer: COMMERCIAL

## 2024-09-24 VITALS
RESPIRATION RATE: 18 BRPM | OXYGEN SATURATION: 99 % | WEIGHT: 223.4 LBS | TEMPERATURE: 97.2 F | BODY MASS INDEX: 38.14 KG/M2 | HEART RATE: 78 BPM | HEIGHT: 64 IN | SYSTOLIC BLOOD PRESSURE: 139 MMHG | DIASTOLIC BLOOD PRESSURE: 90 MMHG

## 2024-09-24 DIAGNOSIS — Z76.89 ESTABLISHING CARE WITH NEW DOCTOR, ENCOUNTER FOR: Primary | ICD-10-CM

## 2024-09-24 DIAGNOSIS — R42 DIZZINESS: ICD-10-CM

## 2024-09-24 ASSESSMENT — COLUMBIA-SUICIDE SEVERITY RATING SCALE - C-SSRS
6. HAVE YOU EVER DONE ANYTHING, STARTED TO DO ANYTHING, OR PREPARED TO DO ANYTHING TO END YOUR LIFE?: NO
1. IN THE PAST MONTH, HAVE YOU WISHED YOU WERE DEAD OR WISHED YOU COULD GO TO SLEEP AND NOT WAKE UP?: NO
2. HAVE YOU ACTUALLY HAD ANY THOUGHTS OF KILLING YOURSELF?: NO

## 2024-09-24 ASSESSMENT — PATIENT HEALTH QUESTIONNAIRE - PHQ9
SUM OF ALL RESPONSES TO PHQ9 QUESTIONS 1 AND 2: 0
1. LITTLE INTEREST OR PLEASURE IN DOING THINGS: NOT AT ALL
2. FEELING DOWN, DEPRESSED OR HOPELESS: NOT AT ALL

## 2024-09-24 ASSESSMENT — PAIN SCALES - GENERAL: PAINLEVEL: 0-NO PAIN

## 2024-09-24 ASSESSMENT — ENCOUNTER SYMPTOMS
OCCASIONAL FEELINGS OF UNSTEADINESS: 0
LOSS OF SENSATION IN FEET: 0
DEPRESSION: 0

## 2024-09-24 NOTE — PATIENT INSTRUCTIONS
It was so great to see you today Desire Dailey  . Today we discussed:    -Filled out RTA paperwork  - get blood work on empty stomach  - Established care, follow up as needed      Call [phone]  For Care if you need to schedule appointment with specialist or images.  IF any labs were ordered today, I will CALL if labs are ABNORMAL. If labs are NORMAL then I will comment on MyChart and mail results to you.    Follow up in       You were see by:    Dr. Michaelle Rowan D.O.  [facility]   Phone: [phone]

## 2024-09-24 NOTE — PROGRESS NOTES
"Subjective   Patient ID: Desire Dailey is a 54 y.o. female  who presents for Follow-up (paperwork).    HPI   Establish care  - History: of HTN, DVT, antiphospholipid antibody syndrome, hyperparathyroidism, SLE, fibromyalgia, and depression  - Meds: performed med rec & current listed below  - Allergies: oxycodone (itching)      Concerns:     #Needs RTA form completed for para transit    #Dizziness  - reports intermittent episodes of dizziness when she stands up  - Reports she drinks a few glasses of water daily  - Has a blood pressure device at home but has not used  - denies HA, confusion, vision changes      Current Outpatient Medications   Medication Instructions    acetaminophen (Tylenol 8 HOUR) 650 mg ER tablet 1 tablet, oral, 3-4 TIMES DAILY<BR>    apixaban (ELIQUIS) 2.5 mg, oral, 2 times daily    ARIPiprazole (Abilify) 10 mg tablet 1 tablet, oral, Daily    cyclobenzaprine (Flexeril) 10 mg tablet 1 tablet, oral, Nightly PRN    escitalopram (Lexapro) 10 mg tablet     hydroxychloroquine (PLAQUENIL) 200 mg, oral, 2 times daily, Takes one tablet in morning and one tablet at night.    losartan (COZAAR) 25 mg, oral, Daily    melatonin 3 mg tablet 2 tablets, oral, Nightly    multivitamin tablet 1 tablet, oral, Daily    omeprazole (PRILOSEC) 20 mg, oral, Daily         Review of Systems  10 pt reviewed negative except listed in HPI    Objective   /90 (BP Location: Right arm, Patient Position: Sitting, BP Cuff Size: Adult)   Pulse 78   Temp 36.2 °C (97.2 °F) (Temporal)   Resp 18   Ht 1.626 m (5' 4\")   Wt 101 kg (223 lb 6.4 oz)   SpO2 99%   BMI 38.35 kg/m²     Physical Exam  Constitutional:       General: She is not in acute distress.  HENT:      Head: Normocephalic and atraumatic.      Nose: Nose normal.   Eyes:      Conjunctiva/sclera: Conjunctivae normal.   Cardiovascular:      Rate and Rhythm: Normal rate and regular rhythm.      Heart sounds: No murmur heard.  Pulmonary:      Effort: Pulmonary effort is " normal.   Musculoskeletal:      Right lower leg: No edema.      Left lower leg: No edema.   Skin:     General: Skin is warm and dry.   Neurological:      General: No focal deficit present.      Mental Status: She is alert.   Psychiatric:         Mood and Affect: Mood normal.         Behavior: Behavior normal.         Assessment/Plan   Desire Dailey is a 54 y.o. female  with PMNx of HTN, DVT, antiphospholipid antibody syndrome, hyperparathyroidism, SLE, fibromyalgia, and depression presents to establish care, get RTA form completed and updated labs. Detailed plan below:   Diagnoses and all orders for this visit:  Establishing care with new doctor, encounter for  -     BI mammo bilateral screening tomosynthesis; Future  -     CBC; Future  -     Lipid panel; Future  -     Vitamin D 25-Hydroxy,Total (for eval of Vitamin D levels); Future  -     Comprehensive metabolic panel; Future  Dizziness  Ddx: dehydration, hypotension with medications, or anemia   -     CBC; Future  -     Discussed drinking 64-90 oz of water daily  -     If symptomatic, patient to check BP at home  -     follow up PRN      RTC in 6 months for HTN follow up  Discussed with Dr. Jose Rowan DO  PGY3

## 2024-09-27 DIAGNOSIS — M79.10 MYALGIA: Primary | ICD-10-CM

## 2024-09-27 RX ORDER — CYCLOBENZAPRINE HCL 10 MG
10 TABLET ORAL NIGHTLY PRN
Qty: 30 TABLET | Refills: 0 | Status: SHIPPED | OUTPATIENT
Start: 2024-09-27

## 2024-10-03 ENCOUNTER — LAB (OUTPATIENT)
Dept: LAB | Facility: HOSPITAL | Age: 54
End: 2024-10-03
Payer: COMMERCIAL

## 2024-10-03 ENCOUNTER — HOSPITAL ENCOUNTER (OUTPATIENT)
Dept: RADIOLOGY | Facility: HOSPITAL | Age: 54
Discharge: HOME | End: 2024-10-03
Payer: COMMERCIAL

## 2024-10-03 VITALS — HEIGHT: 64 IN | WEIGHT: 222.66 LBS | BODY MASS INDEX: 38.01 KG/M2

## 2024-10-03 DIAGNOSIS — Z76.89 ESTABLISHING CARE WITH NEW DOCTOR, ENCOUNTER FOR: ICD-10-CM

## 2024-10-03 DIAGNOSIS — Z12.31 ENCOUNTER FOR SCREENING MAMMOGRAM FOR MALIGNANT NEOPLASM OF BREAST: ICD-10-CM

## 2024-10-03 DIAGNOSIS — R42 DIZZINESS: ICD-10-CM

## 2024-10-03 LAB
25(OH)D3 SERPL-MCNC: 34 NG/ML (ref 30–100)
ALBUMIN SERPL BCP-MCNC: 4.3 G/DL (ref 3.4–5)
ALP SERPL-CCNC: 73 U/L (ref 33–110)
ALT SERPL W P-5'-P-CCNC: 13 U/L (ref 7–45)
ANION GAP SERPL CALC-SCNC: 11 MMOL/L (ref 10–20)
AST SERPL W P-5'-P-CCNC: 25 U/L (ref 9–39)
BILIRUB SERPL-MCNC: 0.5 MG/DL (ref 0–1.2)
BUN SERPL-MCNC: 13 MG/DL (ref 6–23)
CALCIUM SERPL-MCNC: 10.2 MG/DL (ref 8.6–10.3)
CHLORIDE SERPL-SCNC: 103 MMOL/L (ref 98–107)
CHOLEST SERPL-MCNC: 229 MG/DL (ref 0–199)
CHOLESTEROL/HDL RATIO: 2.4
CO2 SERPL-SCNC: 31 MMOL/L (ref 21–32)
CREAT SERPL-MCNC: 1 MG/DL (ref 0.5–1.05)
EGFRCR SERPLBLD CKD-EPI 2021: 68 ML/MIN/1.73M*2
ERYTHROCYTE [DISTWIDTH] IN BLOOD BY AUTOMATED COUNT: 14.2 % (ref 11.5–14.5)
GLUCOSE SERPL-MCNC: 76 MG/DL (ref 74–99)
HCT VFR BLD AUTO: 42.4 % (ref 36–46)
HDLC SERPL-MCNC: 95.8 MG/DL
HGB BLD-MCNC: 13.2 G/DL (ref 12–16)
LDLC SERPL CALC-MCNC: 119 MG/DL
MCH RBC QN AUTO: 27.7 PG (ref 26–34)
MCHC RBC AUTO-ENTMCNC: 31.1 G/DL (ref 32–36)
MCV RBC AUTO: 89 FL (ref 80–100)
NON HDL CHOLESTEROL: 133 MG/DL (ref 0–149)
NRBC BLD-RTO: 0 /100 WBCS (ref 0–0)
PLATELET # BLD AUTO: 166 X10*3/UL (ref 150–450)
POTASSIUM SERPL-SCNC: 3.7 MMOL/L (ref 3.5–5.3)
PROT SERPL-MCNC: 7.8 G/DL (ref 6.4–8.2)
RBC # BLD AUTO: 4.76 X10*6/UL (ref 4–5.2)
SODIUM SERPL-SCNC: 141 MMOL/L (ref 136–145)
TRIGL SERPL-MCNC: 71 MG/DL (ref 0–149)
VLDL: 14 MG/DL (ref 0–40)
WBC # BLD AUTO: 3.4 X10*3/UL (ref 4.4–11.3)

## 2024-10-03 PROCEDURE — 80061 LIPID PANEL: CPT

## 2024-10-03 PROCEDURE — 82306 VITAMIN D 25 HYDROXY: CPT

## 2024-10-03 PROCEDURE — 80053 COMPREHEN METABOLIC PANEL: CPT

## 2024-10-03 PROCEDURE — 36415 COLL VENOUS BLD VENIPUNCTURE: CPT

## 2024-10-03 PROCEDURE — 77067 SCR MAMMO BI INCL CAD: CPT

## 2024-10-03 PROCEDURE — 85027 COMPLETE CBC AUTOMATED: CPT

## 2024-10-08 DIAGNOSIS — M32.14 SLE GLOMERULONEPHRITIS SYNDROME (MULTI): Chronic | ICD-10-CM

## 2024-10-08 RX ORDER — HYDROXYCHLOROQUINE SULFATE 200 MG/1
200 TABLET, FILM COATED ORAL 2 TIMES DAILY
Qty: 90 TABLET | Refills: 3 | Status: SHIPPED | OUTPATIENT
Start: 2024-10-08

## 2024-10-08 NOTE — TELEPHONE ENCOUNTER
Prescription Request for Hydroxychloroquine        Has The Patient Been Identified By Name And Date Of Birth: Yes    RX Requestor: Pharmacy     Date of Last Refill: 5/04/24    Date Of Last Office Visit: 2/09/24 Dr. Chowdhury    Date Of Future Office Visit: 2/07/25 Dr. Chowdhury

## 2024-10-23 DIAGNOSIS — M32.14 SLE GLOMERULONEPHRITIS SYNDROME (MULTI): Chronic | ICD-10-CM

## 2024-10-23 RX ORDER — HYDROXYCHLOROQUINE SULFATE 200 MG/1
200 TABLET, FILM COATED ORAL 2 TIMES DAILY
Qty: 90 TABLET | Refills: 3 | Status: SHIPPED | OUTPATIENT
Start: 2024-10-23

## 2024-11-05 DIAGNOSIS — M79.10 MYALGIA: ICD-10-CM

## 2024-11-05 NOTE — TELEPHONE ENCOUNTER
Prescription Request for Cyclobenzaprine  PATIENT REQUESTING A 90 DAY SUPPLY        Has The Patient Been Identified By Name And Date Of Birth: Yes    RX Requestor: Patient    Date of Last Refill: 9/27/24    Date Of Last Office Visit: 2/09/24 Dr. Chowdhury    Date Of Future Office Visit: 2/07/25 Dr. Chowdhury

## 2024-11-06 RX ORDER — CYCLOBENZAPRINE HCL 10 MG
10 TABLET ORAL NIGHTLY PRN
Qty: 90 TABLET | Refills: 0 | Status: SHIPPED | OUTPATIENT
Start: 2024-11-06

## 2024-12-27 DIAGNOSIS — M79.10 MYALGIA: ICD-10-CM

## 2024-12-27 NOTE — TELEPHONE ENCOUNTER
Prescription Request for Cyclobenzaprine        Has The Patient Been Identified By Name And Date Of Birth: Yes    RX Requestor: Patient    Date of Last Refill: 11/06/24    Date Of Last Office Visit: 2/09/24 Dr. Chowdhury    Date Of Future Office Visit: 2/07/25 Dr. Chowdhury

## 2024-12-29 RX ORDER — CYCLOBENZAPRINE HCL 10 MG
10 TABLET ORAL NIGHTLY PRN
Qty: 90 TABLET | Refills: 0 | Status: SHIPPED | OUTPATIENT
Start: 2024-12-29

## 2025-01-15 ASSESSMENT — ENCOUNTER SYMPTOMS
DIZZINESS: 1
AURA: 1
NEAR-SYNCOPE: 1
HEADACHES: 1
FATIGUE: 1
LOSS OF BALANCE: 1
NEUROLOGIC COMPLAINT: 1
LIGHT-HEADEDNESS: 1
BACK PAIN: 1
VERTIGO: 1

## 2025-01-16 ENCOUNTER — TELEPHONE (OUTPATIENT)
Dept: RHEUMATOLOGY | Facility: CLINIC | Age: 55
End: 2025-01-16
Payer: COMMERCIAL

## 2025-01-16 NOTE — TELEPHONE ENCOUNTER
"Patient isn't ronak for a refill on her Cyclobenzaprine, but she is asking if you could change the directions. Patient asks if you could take out \"as needed\" Patient says her pharmacy gives her a hard time with refills because the script says as needed. She takes them at bedtime, and would like the script to reflect that.    Please advise  Thanks   "

## 2025-01-17 ENCOUNTER — APPOINTMENT (OUTPATIENT)
Facility: HOSPITAL | Age: 55
End: 2025-01-17
Payer: COMMERCIAL

## 2025-01-17 DIAGNOSIS — G43.009 MIGRAINE WITHOUT AURA AND WITHOUT STATUS MIGRAINOSUS, NOT INTRACTABLE: Primary | ICD-10-CM

## 2025-01-17 DIAGNOSIS — M79.10 MYALGIA: ICD-10-CM

## 2025-01-17 PROCEDURE — 99213 OFFICE O/P EST LOW 20 MIN: CPT

## 2025-01-17 RX ORDER — SUMATRIPTAN SUCCINATE 50 MG/1
50 TABLET ORAL ONCE AS NEEDED
Qty: 27 TABLET | Refills: 3 | Status: SHIPPED | OUTPATIENT
Start: 2025-01-17 | End: 2026-01-17

## 2025-01-17 RX ORDER — CYCLOBENZAPRINE HCL 10 MG
10 TABLET ORAL NIGHTLY
Qty: 90 TABLET | Refills: 3 | Status: SHIPPED | OUTPATIENT
Start: 2025-01-17

## 2025-01-17 NOTE — PROGRESS NOTES
Answers submitted by the patient for this visit:  Neurological Problem Questionnaire (Submitted on 1/15/2025)  Chief Complaint: Neurologic complaint  loss of balance: Yes  near-syncope: Yes  Chronicity: recurrent  Onset: more than 1 month ago  Onset quality: gradually  Progression since onset: gradually improving  Focality: facial  aura: Yes  back pain: Yes  dizziness: Yes  fatigue: Yes  vertigo: Yes  headaches: Yes  auditory change: Yes  light-headedness: Yes  Treatments tried: bed rest, drinking, eating, position change  Improvement on treatment: moderate    Vision worse--ophthalmology  Blurry, harder to read close up  audiology     (PRILOSEC) 20 mg, oral, Daily    SUMAtriptan (IMITREX) 50 mg, oral, Once as needed, May repeat after 2 hours.         Review of Systems  10 pt reviewed negative except listed in HPI    Objective   There were no vitals taken for this visit.    Physical Exam (TELEPHONE VISIT ONLY):  Gen: No acute distress  HEENT: Mucous membranes moist  Pulm: Respirations nonlabored  Psych: Normal mood and affect    Assessment/Plan   Desire Dailey is a 54 y.o. female  with PMNx of HTN, DVT, antiphospholipid antibody syndrome, hyperparathyroidism, SLE, fibromyalgia, and depression presents with chief complaint of migraine. Has tried sumatriptan in the past, but cannot recall dosage or how she took the medication. She is willing to trial sumatriptan again. Counseled in detail about how to take this medication. Will have patient follow-up in 4 weeks.   Problem List Items Addressed This Visit       Migraine without aura and without status migrainosus, not intractable - Primary    Relevant Medications    SUMAtriptan (Imitrex) 50 mg tablet     Patient discussed with attending physician, Kerwin Harrington MD.     Ting You DO   FM PGY2

## 2025-01-28 DIAGNOSIS — Z86.718 HISTORY OF DEEP VEIN THROMBOSIS: ICD-10-CM

## 2025-01-28 NOTE — TELEPHONE ENCOUNTER
Copied from CRM #[id number]. Topic: Information Request - Trying to reach PCP  >> Jan 28, 2025 11:49 AM Vikki PERDOMO wrote:  Tried to transfer pt to [facility] re script she just recvd and it rang and hung up, twice. Please call pt at [phone]. Thank you.

## 2025-02-03 ENCOUNTER — APPOINTMENT (OUTPATIENT)
Dept: PRIMARY CARE | Facility: CLINIC | Age: 55
End: 2025-02-03
Payer: COMMERCIAL

## 2025-02-03 PROBLEM — G43.009 MIGRAINE WITHOUT AURA AND WITHOUT STATUS MIGRAINOSUS, NOT INTRACTABLE: Status: ACTIVE | Noted: 2025-02-03

## 2025-02-04 DIAGNOSIS — Z86.718 HISTORY OF DEEP VEIN THROMBOSIS: ICD-10-CM

## 2025-02-07 ENCOUNTER — APPOINTMENT (OUTPATIENT)
Dept: RHEUMATOLOGY | Facility: CLINIC | Age: 55
End: 2025-02-07
Payer: COMMERCIAL

## 2025-02-15 NOTE — PROGRESS NOTES
"Subjective   Patient ID: Desire Dailey \"Jareth" is a 55 y.o. female who presents for Follow-up (Antiphospholipid ).    HPI  Last seen one year ago 2/3/2024    Today Virtual video     SORENESS KNEE WITH KNEELING WHILE IN PRAYER   No swelling   No xrays  She will try Tylenol   Eye exam is normal 2024 due this year     Dr. Frances former Rheum     · Hyperparathyroidism (252.00) (E21.3)   · Antiphospholipid antibody syndrome (289.81) (D68.61)   · Lupus (systemic lupus erythematosus) (710.0) (M32.9)   · Long-term use of Plaquenil (V58.69) (Z79.899)    Referral to Endo last year never went will re schedule     Eye exam done 2023      Current Outpatient Medications   Medication Sig Dispense Refill    acetaminophen (Tylenol 8 HOUR) 650 mg ER tablet Take 1 tablet (650 mg) by mouth. 3-4 TIMES DAILY      apixaban (Eliquis) 2.5 mg tablet Take 1 tablet (2.5 mg) by mouth 2 times a day. 120 tablet 3    ARIPiprazole (Abilify) 10 mg tablet Take 1 tablet (10 mg) by mouth once daily.      cyclobenzaprine (Flexeril) 10 mg tablet Take 1 tablet (10 mg) by mouth once daily at bedtime. 90 tablet 3    escitalopram (Lexapro) 10 mg tablet       hydroxychloroquine (Plaquenil) 200 mg tablet Take 1 tablet (200 mg) by mouth 2 times a day. Takes one tablet in morning and one tablet at night. 90 tablet 3    losartan (Cozaar) 25 mg tablet Take 1 tablet by mouth once daily. 30 tablet 9    melatonin 3 mg tablet Take 2 tablets (6 mg) by mouth once daily at bedtime.      multivitamin tablet Take 1 tablet by mouth once daily. 90 tablet 3    omeprazole (PriLOSEC) 20 mg DR capsule Take 1 capsule (20 mg) by mouth once daily. 90 capsule 3    SUMAtriptan (Imitrex) 50 mg tablet Take 1 tablet (50 mg) by mouth 1 time if needed for migraine. May repeat after 2 hours. 27 tablet 3     No current facility-administered medications for this visit.         has a past medical history of Acute embolism and thrombosis of unspecified deep veins of unspecified lower extremity " (Multi) (08/25/2014), Acute embolism and thrombosis of unspecified deep veins of unspecified lower extremity (Multi) (07/08/2020), Dry eye syndrome of unspecified lacrimal gland (08/03/2017), Encounter for screening for infections with a predominantly sexual mode of transmission (10/06/2020), Injury of conjunctiva and corneal abrasion without foreign body, left eye, initial encounter (08/24/2015), Long term (current) use of anticoagulants (12/16/2014), Meibomian gland dysfunction of unspecified eye, unspecified eyelid (08/03/2017), Other long term (current) drug therapy (03/02/2022), Personal history of other complications of pregnancy, childbirth and the puerperium, Personal history of other specified conditions, Systemic lupus erythematosus, unspecified (03/28/2022), Unspecified cataract (05/12/2015), Unspecified cataract (05/12/2015), Unspecified cataract (05/12/2015), and Unspecified cataract (05/12/2015).   Past Surgical History:   Procedure Laterality Date    FOOT SURGERY  06/02/2016    Foot Surgery Right    IR ANGIOGRAM INFERIOR EPIGASTRIC  12/27/2016    IR ANGIOGRAM INFERIOR EPIGASTRIC 12/27/2016 [facility]    IR ANGIOGRAM INFERIOR EPIGASTRIC  12/27/2016    IR ANGIOGRAM INFERIOR EPIGASTRIC 12/27/2016 [facility]    OTHER SURGICAL HISTORY  01/18/2019    Laparotomy    OTHER SURGICAL HISTORY  06/02/2016    Laparoscopy With Excision Of Ectopic Pregnancy      Social History     Tobacco Use    Smoking status: Never    Smokeless tobacco: Never   Vaping Use    Vaping status: Never Used   Substance Use Topics    Alcohol use: Never    Drug use: Yes     Types: Marijuana      family history includes Angina in her sister; Breast cancer in her sister; Cerebral aneurysm in her mother; Depression in her mother; Diabetes in her father, mother's sister, and sister; Heart attack in her mother; Hyperlipidemia in her mother; Hypertension in her mother, mother's sister, and sister; Migraines in her mother; Pancreatic  cancer in her father; Stroke in her mother and sister; Uterine cancer in her sister.  Pain Management Panel           No data to display                 There were no vitals filed for this visit.  Lab Results   Component Value Date    TSH 0.95 09/01/2023       PHYSICAL EXAM    Some increased   NODES   HEART-  LUNGS-  ABDOMEN   VASCULAR  NEURO -  SKIN-  JOINTS -    PLAN  Diagnoses and all orders for this visit:  Antiphospholipid antibody syndrome (Multi)  Systemic lupus erythematosus, unspecified SLE type, unspecified organ involvement status (Multi)  Elevated parathyroid hormone  -     Referral to Endocrinology; Future      Patient is doing very well  Some minimal hair loss  Some muscle pain but not joint pain she has had this before and is responding to Flexeril as needed    Knee pain see above     Lab work continues to show elevated PTH with recent vitamin D at 36.  I will try again to refer to endocrinology if she did not keep her appointment last year    Try tylenol for knees . If still painful will get xray knees     See in 1 year

## 2025-02-17 ENCOUNTER — APPOINTMENT (OUTPATIENT)
Dept: RHEUMATOLOGY | Facility: CLINIC | Age: 55
End: 2025-02-17
Payer: COMMERCIAL

## 2025-02-17 DIAGNOSIS — R79.89 ELEVATED PARATHYROID HORMONE: ICD-10-CM

## 2025-02-17 DIAGNOSIS — D68.61 ANTIPHOSPHOLIPID ANTIBODY SYNDROME (MULTI): Primary | ICD-10-CM

## 2025-02-17 DIAGNOSIS — M32.9 SYSTEMIC LUPUS ERYTHEMATOSUS, UNSPECIFIED SLE TYPE, UNSPECIFIED ORGAN INVOLVEMENT STATUS (MULTI): ICD-10-CM

## 2025-02-17 PROCEDURE — 99214 OFFICE O/P EST MOD 30 MIN: CPT | Performed by: INTERNAL MEDICINE

## 2025-02-17 PROCEDURE — 1036F TOBACCO NON-USER: CPT | Performed by: INTERNAL MEDICINE

## 2025-03-09 ASSESSMENT — RHEUMATOLOGY NEW PATIENT QUESTIONNAIRE
UNUSUAL FATIGUE: Y
VAGINAL DRYNESS: Y
DEPRESSION: Y
HOW WOULD YOU DESCRIBE YOUR STIFFNESS ON AVERAGE: MODERATE
ABNORMAL URINE: N
DIFFICULTY BREATHING LYING DOWN: N
PAIN OR BURNING ON URINATION: Y
MUSCLE WEAKNESS: N
BEHAVIORAL CHANGES: N
MORNING STIFFNESS IN LOWER BACK: Y
DRYNESS OF MOUTH: Y
MORNING STIFFNESS: Y
EYE REDNESS: N
DOUBLE OR BLURRED VISION: N
INCREASED SUSCEPTIBILITY TO INFECTION: N
NUMBNESS OR TINGLING IN HANDS OR FEET: N
PERSISTENT DIARRHEA: N
EYE PAIN: N
CHEST PAIN: Y
DIFFICULTY STAYING ASLEEP: N
COLOR CHANGES OF HANDS OR FEET IN THE COLD: Y
RASH: N
EASILY LOSING TEMPER: N
UNUSUAL BLEEDING: N
SHORTNESS OF BREATH: N
LOSS OF CONSCIOUSNESS: N
JOINT PAIN: Y
EYE DRYNESS: N
ANEMIA: N
MEMORY LOSS: N
DIFFICULTY SWALLOWING: N
BLOOD IN STOOLS: N
SKIN TIGHTNESS: N
UNUSUALLY RAPID OR SLOWED HEART RATE: N
SKIN REDNESS: N
UNEXPLAINED WEIGHT CHANGE: N
NODULES/BUMPS: Y
HOARSE VOICE: N
LOSS OF VISION: N
HEADACHES: Y
SEIZURES: N
SUN SENSITIVE (SUN ALLERGY): Y
JOINT SWELLING: Y
SORES IN MOUTH OR NOSE: N
SWOLLEN LEGS OR FEET: N
RASH OR ULCERS: N
COUGH: N
EASY BRUISING: Y
STOMACH PAIN: N
AGITATION: N
VOMITING OF BLOOD OR COFFEE GROUND CONSISTENCY MATERIAL: N
JAUNDICE: N
NIGHT SWEATS: N
FAINTING: N
HEARTBURN OR REFLUX: Y
SWOLLEN OR TENDER GLANDS: N
UNEXPLAINED HEARING LOSS: N
EXCESSIVE HAIR LOSS (MORE THAN YOUR NORM): N

## 2025-03-10 ENCOUNTER — APPOINTMENT (OUTPATIENT)
Dept: RHEUMATOLOGY | Facility: CLINIC | Age: 55
End: 2025-03-10
Payer: COMMERCIAL

## 2025-03-10 VITALS
SYSTOLIC BLOOD PRESSURE: 127 MMHG | BODY MASS INDEX: 37.56 KG/M2 | DIASTOLIC BLOOD PRESSURE: 83 MMHG | HEART RATE: 72 BPM | HEIGHT: 64 IN | WEIGHT: 220 LBS

## 2025-03-10 DIAGNOSIS — M32.9 SYSTEMIC LUPUS ERYTHEMATOSUS, UNSPECIFIED SLE TYPE, UNSPECIFIED ORGAN INVOLVEMENT STATUS (MULTI): Primary | ICD-10-CM

## 2025-03-10 PROCEDURE — 99205 OFFICE O/P NEW HI 60 MIN: CPT | Performed by: STUDENT IN AN ORGANIZED HEALTH CARE EDUCATION/TRAINING PROGRAM

## 2025-03-10 PROCEDURE — 3079F DIAST BP 80-89 MM HG: CPT | Performed by: STUDENT IN AN ORGANIZED HEALTH CARE EDUCATION/TRAINING PROGRAM

## 2025-03-10 PROCEDURE — 3008F BODY MASS INDEX DOCD: CPT | Performed by: STUDENT IN AN ORGANIZED HEALTH CARE EDUCATION/TRAINING PROGRAM

## 2025-03-10 PROCEDURE — 3074F SYST BP LT 130 MM HG: CPT | Performed by: STUDENT IN AN ORGANIZED HEALTH CARE EDUCATION/TRAINING PROGRAM

## 2025-03-10 RX ORDER — ARIPIPRAZOLE 5 MG/1
5 TABLET ORAL DAILY
COMMUNITY
Start: 2025-02-25

## 2025-03-10 RX ORDER — BUSPIRONE HYDROCHLORIDE 5 MG/1
5 TABLET ORAL 2 TIMES DAILY
COMMUNITY
Start: 2025-03-06 | End: 2025-09-02

## 2025-03-10 RX ORDER — ESCITALOPRAM OXALATE 20 MG/1
20 TABLET ORAL DAILY
COMMUNITY
Start: 2025-02-25

## 2025-03-10 ASSESSMENT — PAIN SCALES - GENERAL: PAINLEVEL_OUTOF10: 6

## 2025-03-10 NOTE — PROGRESS NOTES
I saw and evaluated the patient. I personally obtained the key and critical portions of the history and physical exam or was physically present for key and critical portions performed by the resident/fellow. I reviewed the resident/fellow's documentation and discussed the patient with the resident/fellow. I agree with the resident/fellow's medical decision making as documented in the note.    Derrick Johnson MD FACR   of Medicine  [facility] - Department of Rheumatology  [facility]

## 2025-03-10 NOTE — PROGRESS NOTES
"Rheumatology Note      Patient Desire Dailey  MRN [id number]     CC: Ms. Desire Dailey is a 55 y.o. female presents to the clinic to establish care for SLE and fibromyalgia     Subjective    Subjective   History of Presenting Illness  Ms. Desire Dailey is a 55 y.o. female with a PMHx of MDD/ARTI/PTSD, fibromyalgia, SLE, and recurrent DVT (previously dx as APLS) presents to establish care. Previously followed with  Rheum (Dr. Chowdhury) but seeking a female physician due to personal preference.     In terms of SLE hx, states she was diagnosed in 2010 based on a positive SANYA >1:640, positive RNP, and positive chromatin checked in the setting of hair loss and proteinuria. Has been on  mg BID since. Never had a kidney biopsy.     Also carries a diagnosis of APLs given hx of multiple recurrent DVT since 1986. Reports previously on Warfarin and at one point Lovenox, now on Elliquis BID. Last clot was in early 2000s. No hx of miscarriages. APL abs checked in 2014 negative. Per previous vascular note at [facility], \"unclear if was truly recurrent DVT or re-imaging of chronic DVT. Lupus anticoagulant, B2-GLP, and anticardiolipin are negative at this time--however, as these titers can wax and wane with time, this does not rule out the possibility of them being positive in the past when she presented with DVT\"     TODAY:  Main concern is bilateral knee pain and stiffness in her hands. Notes she wakes up feeling stiff and \"achy\" with pain that persists throughout the day. Her knees are most notably sore after praying, she prays 5-6 times daily and was previously kneeling directly on hardwood floor. 1 week ago started using a yoga mat under her prayer mat and has found some relief. No significant swelling in the knees.     Additionally, has constant pain and tenderness in her neck, shoulders and down her arms b/l. Is diagnosied with fibromyaglia for which she previously took Gabapentin with no relief. Aware of Cymbalta as an option. " Follows closely with MH provider for history of depression and anxiety.     Denies any hair loss, oral/nasal ulcers, skin rash, photosensitivity, chest pain, or SOB. Does note her fingers turn pale then red/purple in the cold. Doesn't currently take anything for her Raynaud's but feels it is manageable with avoiding cold triggers.     ROS:  Constitutional: Denies fever, chills, fatigue  Head: Denies headaches or hair loss  Eyes: Denies dry eyes, blurry vision, redness of the eyes, pain in the eyes or H/O uveitis  ENT: Denies dry mouth, loss of taste, sores in the mouth, nose bleed, or difficulty swallowing  Cardiovascular: Denies chest pain, palpitations  Respiratory: Denies shortness of breath or cough or wheezing  Gastrointestinal: Denies nausea, vomiting, heartburn, abdominal pain , diarrhea or blood in the stool  Genitourinary: No urinary urgency, frequency, dysuria   Integumentary: Denies photosensitivity, rash or lesions  Neurological: Denies any numbness or tingling or weakness  Hematologic/Lymphatic: Denies bleeding, bruising  MSK: As per HPI. No enthesitis, sausage finger, finger tip ulceration    Past Medical History:   Diagnosis Date    Acute embolism and thrombosis of unspecified deep veins of unspecified lower extremity (Multi) 08/25/2014    Deep vein thrombosis of lower extremity    Acute embolism and thrombosis of unspecified deep veins of unspecified lower extremity (Multi) 07/08/2020    DVT, lower extremity, recurrent    Dry eye syndrome of unspecified lacrimal gland 08/03/2017    Dry eye syndrome    Encounter for screening for infections with a predominantly sexual mode of transmission 10/06/2020    Routine screening for STI (sexually transmitted infection)    Injury of conjunctiva and corneal abrasion without foreign body, left eye, initial encounter 08/24/2015    Abrasion of cornea, left    Long term (current) use of anticoagulants 12/16/2014    Anticoagulated on warfarin    Meibomian gland  "dysfunction of unspecified eye, unspecified eyelid 08/03/2017    MGD (meibomian gland disease)    Other long term (current) drug therapy 03/02/2022    Long-term use of Plaquenil    Personal history of other complications of pregnancy, childbirth and the puerperium     History of ectopic pregnancy    Personal history of other specified conditions     History of abdominal pain    Systemic lupus erythematosus, unspecified 03/28/2022    Lupus (systemic lupus erythematosus)    Unspecified cataract 05/12/2015    Cataract of right eye    Unspecified cataract 05/12/2015    Cataract of left eye    Unspecified cataract 05/12/2015    Cataract of right eye    Unspecified cataract 05/12/2015    Cataract of left eye        Allergies   Allergen Reactions    Oxycodone Hives, Itching and Rash    Percocet [Oxycodone-Acetaminophen] Hives, Itching, Rash and Unknown     patient states that she developed hives and shortness of breath on percocet, has tolerated tylenol in the past    Iodine Other        Objective   Objective     /83 (BP Location: Right arm, Patient Position: Sitting, BP Cuff Size: Large adult)   Pulse 72   Ht 1.626 m (5' 4\")   Wt 99.8 kg (220 lb)   BMI 37.76 kg/m²      PHYSICAL EXAM:  General - NAD, pleasant, AAOx3  HEENT- PERRLA, EOMI. No conjunctiva injection, MMM, no facial rash   Cardiovascular - Regular rate and rhythm.   Lungs - breathing comfortably on RA, no cough, no wheezing   Skin - No rashes or ulcers on visible skin   Extremities - No edema, cyanosis ,or clubbing  Neurological - Alert and oriented x 3,  grossly intact. No focal deficit.    Musculoskeletal- +diffuse myofascial TTP   Shoulders: TTP R>L, full ROM   Elbows: Full ROM, without pain, no swelling, warmth or tenderness.  Wrists: Full ROM, no swelling or warmth, +TTP  MCP: No swelling, warmth or tenderness. Metacarpal squeeze negative  PIP: No swelling, warmth or tenderness.  DIP: No swelling, warmth or tenderness.  Hands : 5/5.    Hips: " "Full ROM.  No malalignment.  Knees:  Full ROM,  no swelling, warmth , mild TTP b/l   Ankles: Full ROM, without pain, swelling, warmth or tenderness.     LABS:  Lab Results   Component Value Date    WBC 3.4 (L) 10/03/2024    HGB 13.2 10/03/2024    HCT 42.4 10/03/2024    MCV 89 10/03/2024     10/03/2024      Lab Results   Component Value Date    GLUCOSE 76 10/03/2024    CO2 31 10/03/2024    BUN 13 10/03/2024    EGFR 68 10/03/2024    CALCIUM 10.2 10/03/2024    ALBUMIN 4.3 10/03/2024      No results found for: \"CRP\"  No results found for: \"SEDRATE\"  No results found for: \"C3\", \"C4\"  No results found for: \"RF\", \"CITAB\"  Lab Results   Component Value Date    ANATITER 1:160 03/02/2022    ARNP 5.4 (A) 03/02/2022    ASMRN >8.0 (A) 03/02/2022    ASSA <0.2 03/02/2022    ASSB <0.2 03/02/2022    ASCL <0.2 03/02/2022    JO1 <0.2 03/02/2022    ACHR 4.2 (A) 03/02/2022    ACEN <0.2 03/02/2022    RIBPP 0.2 03/02/2022    DNADS 1.0 03/02/2022     Lab Results   Component Value Date    PROTUR 30 (1+) (A) 03/10/2022    GLUCOSEU NEGATIVE 03/10/2022    BLOODU NEGATIVE 03/10/2022    KETONESU NEGATIVE 03/10/2022    NITRITEU NEGATIVE 03/10/2022    LEUKOCYTESU NEGATIVE 03/10/2022     Lab Results   Component Value Date    UTPCR 0.08 01/03/2018        No results found for: \"URICACID\"  No results found for: \"COLORFL\", \"CLARITYFLUID\", \"WBCFL\", \"NEUTROBFREL\", \"LYMPHSBFREL\", \"EOSBFREL\", \"BASOBFREL\", \"PLASMACFLD\", \"RBCFL\", \"CRYSFL\"    IMAGING:  === 03/26/14 ===    CT CHEST FOR PE    - Impression -  1. No CT evidence of acute cardiopulmonary process. Specifically, no  evidence of pulmonary embolus.  2. Examination esophagus is limited, however, air is noted within the  right aspect of the middle posterior mediastinum, which could  represent an esophageal diverticulum. Recommend followup esophagram.      I personally reviewed the image(s)/study and resident interpretation.  I agree with the findings as stated.  This study was interpreted " at  [facility], [place], [place].    ___________________________________________________________________________    CT HEAD WO CONTRAST     Assessment    Assessment & Plan   Ms. Desire Dailey is a 55 y.o. female with hx of SLE and fibromyalgia presents with complaint of bilateral knee pain and generalized body aches/tenderness. Currently no evidence of synovitis or active SLE on exam today. Knee pain likely activity related in setting of kneeling on a hard surface multiple times a day, notably has improved with use of a yoga mat.     Plan for now will be to check lupus labs for evidence of active disease. Advised to continue  mg BID for now. Discussed supportive care for her knee pain and recommend Voltaren as needed. Also suggested trying gloves to protect her knuckles while praying as well. Will follow up with results and RTC 4-5 months.     Independently reviewed three or more labs  Images including CXR, CT, MRI independently reviewed.     Case seen and discussed with Dr. Johnson   Signature: Susan Araujo,   Date: March 10, 2025

## 2025-03-11 LAB
ALBUMIN SERPL-MCNC: 4.7 G/DL (ref 3.6–5.1)
ALP SERPL-CCNC: 71 U/L (ref 37–153)
ALT SERPL-CCNC: 13 U/L (ref 6–29)
ANION GAP SERPL CALCULATED.4IONS-SCNC: 12 MMOL/L (CALC) (ref 7–17)
AST SERPL-CCNC: 24 U/L (ref 10–35)
BASOPHILS # BLD AUTO: 20 CELLS/UL (ref 0–200)
BASOPHILS NFR BLD AUTO: 0.5 %
BILIRUB SERPL-MCNC: 0.4 MG/DL (ref 0.2–1.2)
BUN SERPL-MCNC: 17 MG/DL (ref 7–25)
C3 SERPL-MCNC: 157 MG/DL (ref 83–193)
C4 SERPL-MCNC: 32 MG/DL (ref 15–57)
CALCIUM SERPL-MCNC: 10.6 MG/DL (ref 8.6–10.4)
CHLORIDE SERPL-SCNC: 100 MMOL/L (ref 98–110)
CO2 SERPL-SCNC: 26 MMOL/L (ref 20–32)
CREAT SERPL-MCNC: 0.95 MG/DL (ref 0.5–1.03)
CRP SERPL-MCNC: <3 MG/L
DSDNA AB SER-ACNC: <1 IU/ML
EGFRCR SERPLBLD CKD-EPI 2021: 71 ML/MIN/1.73M2
EOSINOPHIL # BLD AUTO: 92 CELLS/UL (ref 15–500)
EOSINOPHIL NFR BLD AUTO: 2.3 %
ERYTHROCYTE [DISTWIDTH] IN BLOOD BY AUTOMATED COUNT: 13.1 % (ref 11–15)
ERYTHROCYTE [SEDIMENTATION RATE] IN BLOOD BY WESTERGREN METHOD: 28 MM/H
GLUCOSE SERPL-MCNC: 90 MG/DL (ref 65–99)
HCT VFR BLD AUTO: 43.9 % (ref 35–45)
HGB BLD-MCNC: 13.2 G/DL (ref 11.7–15.5)
LYMPHOCYTES # BLD AUTO: 2108 CELLS/UL (ref 850–3900)
LYMPHOCYTES NFR BLD AUTO: 52.7 %
MCH RBC QN AUTO: 27.8 PG (ref 27–33)
MCHC RBC AUTO-ENTMCNC: 30.1 G/DL (ref 32–36)
MCV RBC AUTO: 92.4 FL (ref 80–100)
MONOCYTES # BLD AUTO: 496 CELLS/UL (ref 200–950)
MONOCYTES NFR BLD AUTO: 12.4 %
NEUTROPHILS # BLD AUTO: 1284 CELLS/UL (ref 1500–7800)
NEUTROPHILS NFR BLD AUTO: 32.1 %
PLATELET # BLD AUTO: 178 THOUSAND/UL (ref 140–400)
PMV BLD REES-ECKER: 11.5 FL (ref 7.5–12.5)
POTASSIUM SERPL-SCNC: 4.5 MMOL/L (ref 3.5–5.3)
PROT SERPL-MCNC: 8 G/DL (ref 6.1–8.1)
RBC # BLD AUTO: 4.75 MILLION/UL (ref 3.8–5.1)
SODIUM SERPL-SCNC: 138 MMOL/L (ref 135–146)
WBC # BLD AUTO: 4 THOUSAND/UL (ref 3.8–10.8)

## 2025-03-12 ENCOUNTER — APPOINTMENT (OUTPATIENT)
Dept: OPHTHALMOLOGY | Facility: CLINIC | Age: 55
End: 2025-03-12
Payer: COMMERCIAL

## 2025-03-12 DIAGNOSIS — H02.88B MEIBOMIAN GLAND DYSFUNCTION LEFT EYE, UPPER AND LOWER EYELIDS: ICD-10-CM

## 2025-03-12 DIAGNOSIS — M32.14 SLE GLOMERULONEPHRITIS SYNDROME (MULTI): ICD-10-CM

## 2025-03-12 DIAGNOSIS — Z79.899 LONG-TERM USE OF PLAQUENIL: ICD-10-CM

## 2025-03-12 DIAGNOSIS — H52.4 PRESBYOPIA: ICD-10-CM

## 2025-03-12 DIAGNOSIS — D68.61 ANTIPHOSPHOLIPID ANTIBODY SYNDROME (MULTI): ICD-10-CM

## 2025-03-12 DIAGNOSIS — H52.03 HYPEROPIA OF BOTH EYES: Primary | ICD-10-CM

## 2025-03-12 DIAGNOSIS — H52.223 REGULAR ASTIGMATISM OF BOTH EYES: ICD-10-CM

## 2025-03-12 DIAGNOSIS — H02.88A MEIBOMIAN GLAND DYSFUNCTION RIGHT EYE, UPPER AND LOWER EYELIDS: ICD-10-CM

## 2025-03-12 DIAGNOSIS — H40.039 ANATOMICAL NARROW ANGLE: ICD-10-CM

## 2025-03-12 PROCEDURE — 92015 DETERMINE REFRACTIVE STATE: CPT | Performed by: OPTOMETRIST

## 2025-03-12 PROCEDURE — 92014 COMPRE OPH EXAM EST PT 1/>: CPT | Performed by: OPTOMETRIST

## 2025-03-12 PROCEDURE — 92083 EXTENDED VISUAL FIELD XM: CPT | Performed by: OPTOMETRIST

## 2025-03-12 PROCEDURE — 92134 CPTRZ OPH DX IMG PST SGM RTA: CPT | Mod: BILATERAL PROCEDURE | Performed by: OPTOMETRIST

## 2025-03-12 PROCEDURE — 92020 GONIOSCOPY: CPT | Performed by: OPTOMETRIST

## 2025-03-12 ASSESSMENT — GONIOSCOPY
OS_NASAL: PTM
OS_SUPERIOR: PTM
OD_INFERIOR: SS
OD_SUPERIOR: PTM
OD_NASAL: PTM
OS_TEMPORAL: PTM
OD_TEMPORAL: PTM
OS_INFERIOR: SS

## 2025-03-12 ASSESSMENT — TONOMETRY
IOP_METHOD: GOLDMANN APPLANATION
OD_IOP_MMHG: 11
OS_IOP_MMHG: 12

## 2025-03-12 ASSESSMENT — REFRACTION_MANIFEST
OD_ADD: +2.25
OD_CYLINDER: -0.50
OD_AXIS: 075
METHOD_AUTOREFRACTION: 1
OD_SPHERE: +1.00
OS_AXIS: 135
OS_ADD: +2.25
OS_CYLINDER: -0.75
OD_AXIS: 075
OD_CYLINDER: -0.25
OS_AXIS: 135
OD_SPHERE: +1.50
OS_CYLINDER: -0.75
OS_SPHERE: +1.75
OS_SPHERE: +1.75

## 2025-03-12 ASSESSMENT — REFRACTION
OS_CYLINDER: -0.75
OD_SPHERE: +1.50
OD_AXIS: 075
OS_SPHERE: +1.75
OD_CYLINDER: -0.50
OD_ADD: +2.25
OS_ADD: +2.25
OS_AXIS: 135

## 2025-03-12 ASSESSMENT — REFRACTION_WEARINGRX
OD_SPHERE: +0.50
OS_AXIS: 020
OS_ADD: +2.25
OS_SPHERE: +0.50
OS_CYLINDER: -0.50
OD_CYLINDER: -0.50
OD_ADD: +2.25
OD_AXIS: 078

## 2025-03-12 ASSESSMENT — ENCOUNTER SYMPTOMS
RESPIRATORY NEGATIVE: 0
HEMATOLOGIC/LYMPHATIC NEGATIVE: 0
ALLERGIC/IMMUNOLOGIC NEGATIVE: 0
CONSTITUTIONAL NEGATIVE: 0
NEUROLOGICAL NEGATIVE: 0
EYES NEGATIVE: 1
CARDIOVASCULAR NEGATIVE: 0
ENDOCRINE NEGATIVE: 0
PSYCHIATRIC NEGATIVE: 0
MUSCULOSKELETAL NEGATIVE: 0
GASTROINTESTINAL NEGATIVE: 0

## 2025-03-12 ASSESSMENT — CONF VISUAL FIELD
OS_INFERIOR_NASAL_RESTRICTION: 0
OD_NORMAL: 1
OS_SUPERIOR_TEMPORAL_RESTRICTION: 0
OD_INFERIOR_TEMPORAL_RESTRICTION: 0
OD_INFERIOR_NASAL_RESTRICTION: 0
OD_SUPERIOR_NASAL_RESTRICTION: 0
OS_SUPERIOR_NASAL_RESTRICTION: 0
OS_INFERIOR_TEMPORAL_RESTRICTION: 0
OS_NORMAL: 1
OD_SUPERIOR_TEMPORAL_RESTRICTION: 0
METHOD: COUNTING FINGERS

## 2025-03-12 ASSESSMENT — VISUAL ACUITY
CORRECTION_TYPE: GLASSES
METHOD: SNELLEN - LINEAR
OD_CC: 20/25
OS_CC: 20/25-1

## 2025-03-12 ASSESSMENT — EXTERNAL EXAM - LEFT EYE: OS_EXAM: NORMAL

## 2025-03-12 ASSESSMENT — SLIT LAMP EXAM - LIDS
COMMENTS: GOOD POSITION, 3+ MGD
COMMENTS: GOOD POSITION, 3+ MGD

## 2025-03-12 ASSESSMENT — CUP TO DISC RATIO
OS_RATIO: .45
OD_RATIO: .45

## 2025-03-12 ASSESSMENT — EXTERNAL EXAM - RIGHT EYE: OD_EXAM: NORMAL

## 2025-03-12 NOTE — Clinical Note
No HCQ toxicity on exam or testing. Will monitor in 1 year w/ repeat exam and testing or sooner prn.

## 2025-03-12 NOTE — PROGRESS NOTES
Assessment/Plan   Diagnoses and all orders for this visit:  Hyperopia of both eyes  Presbyopia  Regular astigmatism of both eyes  New spec rx released today per patient request. Ocular health wnl for age OU. Monitor 1 year or sooner prn. Refraction billed today. Pt consents to receiving glasses Rx today. Patient's/guardian's signature obtained to acknowledge and confirm that a paper copy of glasses Rx was given to patient in compliance with [facility] Eyeglass Rule. Electronic copy of Rx will also be available via [facility].   Option to RTC for cl eval prn, quoted $90 fit fee, option for monova, MF CL.     Long-term use of Plaquenil  -     OCT, Retina - OU - Both Eyes  -     [place] Visual Field - OU - Both Eyes  SLE glomerulonephritis syndrome (Multi)  Antiphospholipid antibody syndrome (Multi)  Studies reveal that the risk of maculopathy when taking Plaquenil is 0% (0-5 years), 1% (over 5 years), 2% (over 10 years), and 20% cumulative, or 4% annual incidence (over 20 years) respectively.  Annual testing with 10-2 threshold visual field perimetry, as well as spectral domain optical coherence tomography of the macula is recommended. No signs of plaquenil toxicity today od/os, macula OCT today, HVF 10-2 today  monitor 1 year or sooner with any acute vision change. HVF 10-2 and OCT Mac at next comprehensive exam.    Anatomical narrow angle  Physiologic. No evidence of glaucoma. Recommend monitor in 1 year w/ gonio and OCT RNFL.     Meibomian gland dysfunction right eye, upper and lower eyelids  Meibomian gland dysfunction left eye, upper and lower eyelids  Recommend hot compresses with lid massage qday-bid OU. Discussed that this is a chronic problem which requires chronic/consistent treatment.

## 2025-03-17 ENCOUNTER — HOSPITAL ENCOUNTER (EMERGENCY)
Facility: HOSPITAL | Age: 55
Discharge: HOME | End: 2025-03-17
Payer: COMMERCIAL

## 2025-03-17 VITALS
HEART RATE: 99 BPM | SYSTOLIC BLOOD PRESSURE: 135 MMHG | OXYGEN SATURATION: 96 % | DIASTOLIC BLOOD PRESSURE: 77 MMHG | RESPIRATION RATE: 18 BRPM | TEMPERATURE: 97.9 F

## 2025-03-17 DIAGNOSIS — N30.00 ACUTE CYSTITIS WITHOUT HEMATURIA: Primary | ICD-10-CM

## 2025-03-17 DIAGNOSIS — A59.01 TRICHOMONIASIS OF VAGINA: ICD-10-CM

## 2025-03-17 LAB
APPEARANCE UR: ABNORMAL
BACTERIA #/AREA URNS AUTO: ABNORMAL /HPF
BILIRUB UR STRIP.AUTO-MCNC: NEGATIVE MG/DL
CLUE CELLS SPEC QL WET PREP: ABNORMAL
COLOR UR: YELLOW
GLUCOSE UR STRIP.AUTO-MCNC: NORMAL MG/DL
HOLD SPECIMEN: NORMAL
KETONES UR STRIP.AUTO-MCNC: NEGATIVE MG/DL
LEUKOCYTE ESTERASE UR QL STRIP.AUTO: ABNORMAL
MUCOUS THREADS #/AREA URNS AUTO: ABNORMAL /LPF
NITRITE UR QL STRIP.AUTO: ABNORMAL
PH UR STRIP.AUTO: 6 [PH]
PROT UR STRIP.AUTO-MCNC: ABNORMAL MG/DL
RBC # UR STRIP.AUTO: NEGATIVE MG/DL
RBC #/AREA URNS AUTO: ABNORMAL /HPF
SP GR UR STRIP.AUTO: 1.03
SQUAMOUS #/AREA URNS AUTO: ABNORMAL /HPF
T VAGINALIS SPEC QL WET PREP: PRESENT
UROBILINOGEN UR STRIP.AUTO-MCNC: NORMAL MG/DL
WBC #/AREA URNS AUTO: >50 /HPF
WBC VAG QL WET PREP: ABNORMAL
YEAST VAG QL WET PREP: ABNORMAL

## 2025-03-17 PROCEDURE — 2500000001 HC RX 250 WO HCPCS SELF ADMINISTERED DRUGS (ALT 637 FOR MEDICARE OP): Mod: SE | Performed by: NURSE PRACTITIONER

## 2025-03-17 PROCEDURE — 81001 URINALYSIS AUTO W/SCOPE: CPT | Performed by: NURSE PRACTITIONER

## 2025-03-17 PROCEDURE — 99283 EMERGENCY DEPT VISIT LOW MDM: CPT

## 2025-03-17 PROCEDURE — 99284 EMERGENCY DEPT VISIT MOD MDM: CPT | Performed by: NURSE PRACTITIONER

## 2025-03-17 PROCEDURE — 87210 SMEAR WET MOUNT SALINE/INK: CPT | Performed by: NURSE PRACTITIONER

## 2025-03-17 PROCEDURE — 87086 URINE CULTURE/COLONY COUNT: CPT | Performed by: NURSE PRACTITIONER

## 2025-03-17 PROCEDURE — 87491 CHLMYD TRACH DNA AMP PROBE: CPT | Performed by: NURSE PRACTITIONER

## 2025-03-17 RX ORDER — METRONIDAZOLE 500 MG/1
500 TABLET ORAL 2 TIMES DAILY
Qty: 14 TABLET | Refills: 0 | Status: SHIPPED | OUTPATIENT
Start: 2025-03-17 | End: 2025-03-24

## 2025-03-17 RX ORDER — FLUCONAZOLE 150 MG/1
150 TABLET ORAL ONCE AS NEEDED
Qty: 1 TABLET | Refills: 0 | Status: SHIPPED | OUTPATIENT
Start: 2025-03-17

## 2025-03-17 RX ORDER — CEPHALEXIN 500 MG/1
500 CAPSULE ORAL 4 TIMES DAILY
Qty: 28 CAPSULE | Refills: 0 | Status: SHIPPED | OUTPATIENT
Start: 2025-03-17 | End: 2025-03-20 | Stop reason: ALTCHOICE

## 2025-03-17 RX ORDER — CEPHALEXIN 250 MG/1
500 CAPSULE ORAL ONCE
Status: COMPLETED | OUTPATIENT
Start: 2025-03-17 | End: 2025-03-17

## 2025-03-17 RX ADMIN — CEPHALEXIN 500 MG: 250 CAPSULE ORAL at 10:53

## 2025-03-17 ASSESSMENT — LIFESTYLE VARIABLES
HAVE YOU EVER FELT YOU SHOULD CUT DOWN ON YOUR DRINKING: NO
TOTAL SCORE: 0
EVER HAD A DRINK FIRST THING IN THE MORNING TO STEADY YOUR NERVES TO GET RID OF A HANGOVER: NO
HAVE PEOPLE ANNOYED YOU BY CRITICIZING YOUR DRINKING: NO
EVER FELT BAD OR GUILTY ABOUT YOUR DRINKING: NO

## 2025-03-17 ASSESSMENT — PAIN - FUNCTIONAL ASSESSMENT: PAIN_FUNCTIONAL_ASSESSMENT: 0-10

## 2025-03-17 NOTE — ED PROVIDER NOTES
Emergency Department Encounter  [facility] EMERGENCY MEDICINE    Patient: Desire Dailey  MRN: [id number]  : 1970  Date of Evaluation: 3/17/2025  ED Provider: NATAN Mejía      Chief Complaint       Chief Complaint   Patient presents with    Vaginal Discharge        Limitations to History: none  Historian: patient  Records reviewed: EMR inpatient and outpatient notes, Care Everywhere    This is a 55-year-old female with a PMH of hypertension, DVT, antiphospholipid antibody syndrome, hyperparathyroidism, lupus, fibromyalgia, depression and anxiety who presents to the emergency room with vaginal dryness.  Patient states that she developed vaginal dryness and irritation 3 to 4 days ago.  Patient states that she noticed vaginal spotting with occasional discharge.  Patient states that she is sexually active, denies any new partners.  Denies any pain with urination, fevers, chills or abdominal pain.  Denies taking any medications over-the-counter for symptoms.    PMH: hypertension, DVT, antiphospholipid antibody syndrome, hyperparathyroidism, lupus, fibromyalgia, depression and anxiety  PSH: Surgery for an ectopic pregnancy  Social HX: Denies smoking, alcohol or drug use.  Family HX: No family history pertinent to current presenting problem  Medications: Reviewed per EMR    ROS:     Review of Systems   Genitourinary:  Positive for vaginal discharge and vaginal pain.     14 systems reviewed and otherwise acutely negative except as in the [place].        Past History     Past Medical History:   Diagnosis Date    Acute embolism and thrombosis of unspecified deep veins of unspecified lower extremity (Multi) 2014    Deep vein thrombosis of lower extremity    Acute embolism and thrombosis of unspecified deep veins of unspecified lower extremity (Multi) 2020    DVT, lower extremity, recurrent    Allergic 4    Anxiety 87    Chronic kidney disease 2010    Depression 86     Dry eye syndrome of unspecified lacrimal gland 08/03/2017    Dry eye syndrome    Encounter for screening for infections with a predominantly sexual mode of transmission 10/06/2020    Routine screening for STI (sexually transmitted infection)    Fibromyalgia, primary 2010    History of transfusion 1986    Hypertension 02/02-10    Injury of conjunctiva and corneal abrasion without foreign body, left eye, initial encounter 08/24/2015    Abrasion of cornea, left    Long term (current) use of anticoagulants 12/16/2014    Anticoagulated on warfarin    Meibomian gland dysfunction of unspecified eye, unspecified eyelid 08/03/2017    MGD (meibomian gland disease)    Migraine 11/24    Other long term (current) drug therapy 03/02/2022    Long-term use of Plaquenil    Personal history of other complications of pregnancy, childbirth and the puerperium     History of ectopic pregnancy    Personal history of other specified conditions     History of abdominal pain    Systemic lupus erythematosus, unspecified 03/28/2022    Lupus (systemic lupus erythematosus)    Unspecified cataract 05/12/2015    Cataract of right eye    Unspecified cataract 05/12/2015    Cataract of left eye    Unspecified cataract 05/12/2015    Cataract of right eye    Unspecified cataract 05/12/2015    Cataract of left eye    Varicella 06/06/79    Visual impairment 05/05/20    Vitamin D deficiency 2020     Past Surgical History:   Procedure Laterality Date    BUNIONECTOMY  2014    FOOT SURGERY  06/02/2016    Foot Surgery Right    IR ANGIOGRAM INFERIOR EPIGASTRIC  12/27/2016    IR ANGIOGRAM INFERIOR EPIGASTRIC 12/27/2016 [facility]    IR ANGIOGRAM INFERIOR EPIGASTRIC  12/27/2016    IR ANGIOGRAM INFERIOR EPIGASTRIC 12/27/2016 [facility]IB [facility]GAC[facility]    OTHER SURGICAL HISTORY  01/18/2019    Laparotomy    OTHER SURGICAL HISTORY  06/02/2016    Laparoscopy With Excision Of Ectopic Pregnancy    TOE SURGERY  2024         Medications/Allergies     Previous Medications     ACETAMINOPHEN (TYLENOL 8 HOUR) 650 MG ER TABLET    Take 1 tablet (650 mg) by mouth. 3-4 TIMES DAILY    APIXABAN (ELIQUIS) 2.5 MG TABLET    Take 1 tablet (2.5 mg) by mouth 2 times a day.    ARIPIPRAZOLE (ABILIFY) 10 MG TABLET    Take 1 tablet (10 mg) by mouth once daily.    ARIPIPRAZOLE (ABILIFY) 5 MG TABLET    Take 1 tablet (5 mg) by mouth once daily.    BUSPIRONE (BUSPAR) 5 MG TABLET    Take 1 tablet (5 mg) by mouth twice a day.    CYCLOBENZAPRINE (FLEXERIL) 10 MG TABLET    Take 1 tablet (10 mg) by mouth once daily at bedtime.    ESCITALOPRAM (LEXAPRO) 10 MG TABLET        ESCITALOPRAM (LEXAPRO) 20 MG TABLET    Take 1 tablet (20 mg) by mouth once daily.    HYDROXYCHLOROQUINE (PLAQUENIL) 200 MG TABLET    Take 1 tablet (200 mg) by mouth 2 times a day. Takes one tablet in morning and one tablet at night.    LOSARTAN (COZAAR) 25 MG TABLET    Take 1 tablet by mouth once daily.    MELATONIN 3 MG TABLET    Take 2 tablets (6 mg) by mouth once daily at bedtime.    MULTIVITAMIN TABLET    Take 1 tablet by mouth once daily.    OMEPRAZOLE (PRILOSEC) 20 MG DR CAPSULE    Take 1 capsule (20 mg) by mouth once daily.    SUMATRIPTAN (IMITREX) 50 MG TABLET    Take 1 tablet (50 mg) by mouth 1 time if needed for migraine. May repeat after 2 hours.     Allergies   Allergen Reactions    Oxycodone Hives, Itching and Rash    Percocet [Oxycodone-Acetaminophen] Hives, Itching, Rash and Unknown     patient states that she developed hives and shortness of breath on percocet, has tolerated tylenol in the past    Iodine Other        Physical Exam       ED Triage Vitals [03/17/25 0904]   Temperature Heart Rate Respirations BP   36.6 °C (97.9 °F) 99 18 135/77      Pulse Ox Temp src Heart Rate Source Patient Position   96 % -- -- --      BP Location FiO2 (%)     -- --       Physical Exam:    Appearance: Alert, oriented , cooperative,  in no acute distress. Well nourished & well hydrated.    Skin: Intact,  dry skin, no lesions, rash, petechiae or  purpura.     Eyes: PERRLA, EOMs intact.    ENT: Hearing grossly intact. External auditory canals patent, tympanic membranes intact with visible landmarks. Nares patent, mucus membranes moist. Dentition without lesions. Pharynx clear, uvula midline.     Neck: Supple, without meningismus.     Pulmonary: Clear bilaterally with good chest wall excursion. No rales, rhonchi or wheezing. No accessory muscle use or stridor.    Cardiac: Normal S1, S2 without murmur, rub, gallop or extrasystole. No JVD, Carotids without bruits.    Abdomen: Soft, nontender, active bowel sounds.  No palpable organomegaly.  No rebound or guarding.      Genitourinary: Exam completed with VENUS Wang at bedside. External genitalia WNL. Cervical os closed. Negative CMT or adnexal tenderness. Small amount of white vaginal discharge. No bleeding.    Musculoskeletal: Full range of motion. no pain, edema, or deformity. Pulses full and equal. No cyanosis, clubbing, or edema.    Neurological:  Normal sensation, no weakness, no focal findings identified.    Psychiatric: Appropriate mood and affect.       Diagnostics   Labs:  Results for orders placed or performed during the hospital encounter of 03/17/25 (from the past 24 hours)   Trichomonas Wet Prep Reflex to PCR   Result Value Ref Range    Trichomonas Present (A) None Seen    Clue Cells None Seen None Seen    Yeast None Seen None Seen    WBC 1-2    Urinalysis with Reflex Culture and Microscopic   Result Value Ref Range    Color, Urine Yellow Light-Yellow, Yellow, Dark-Yellow    Appearance, Urine Turbid (N) Clear    Specific Gravity, Urine 1.028 1.005 - 1.035    pH, Urine 6.0 5.0, 5.5, 6.0, 6.5, 7.0, 7.5, 8.0    Protein, Urine 30 (1+) (A) NEGATIVE, 10 (TRACE), 20 (TRACE) mg/dL    Glucose, Urine Normal Normal mg/dL    Blood, Urine NEGATIVE NEGATIVE mg/dL    Ketones, Urine NEGATIVE NEGATIVE mg/dL    Bilirubin, Urine NEGATIVE NEGATIVE mg/dL    Urobilinogen, Urine Normal Normal mg/dL    Nitrite, Urine 2+  (A) NEGATIVE    Leukocyte Esterase, Urine 500 Gina/uL (A) NEGATIVE   Microscopic Only, Urine   Result Value Ref Range    WBC, Urine >50 (A) 1-5, NONE /HPF    RBC, Urine 6-10 (A) NONE, 1-2, 3-5 /HPF    Squamous Epithelial Cells, Urine 1-9 (SPARSE) Reference range not established. /HPF    Bacteria, Urine 1+ (A) NONE SEEN /HPF    Mucus, Urine FEW Reference range not established. /LPF     *Note: Due to a large number of results and/or encounters for the requested time period, some results have not been displayed. A complete set of results can be found in Results Review.          Assessment   In brief, Desire Dailey is a 55 y.o. female who presented to the emergency department with vaginal dryness and irritation.          ED Course/MDM     Diagnoses as of 03/17/25 1051   Acute cystitis without hematuria   Trichomoniasis of vagina      Visit Vitals  /77   Pulse 99   Temp 36.6 °C (97.9 °F)   Resp 18   SpO2 96%   OB Status Perimenopausal   Smoking Status Never       Medications   cephalexin (Keflex) capsule 500 mg (has no administration in time range)       Patient remained stable while in the emergency department. Previous outpatient and ED records were reviewed. Outside records were reviewed.  Differentials include STI, UTI, BV, yeast infection.  Wet prep shows positive trichomonas.  Negative clue cells or yeast cells seen.  GC/chlamydia culture was obtained, pending results.  Urinalysis had 500 leukocytes, greater than 50 white cells and 1+ bacteria.  Urine culture was obtained, pending results.  Patient received 1 dose of Keflex 500 mg p.o. for a UTI.  Patient was advised of findings.  Patient was advised to have her partner tested and treated for trichomonas.  Patient was advised to refrain from intercourse for at least 7 days, follow-up with her OB/GYN and return the emergency room with worsening symptoms.  Final Impression      1. Acute cystitis without hematuria    2. Trichomoniasis of vagina           DISPOSITION  Disposition: Discharged home    Comment: Please note this report has been produced using speech recognition software and may contain errors related to that system including errors in grammar, punctuation, and spelling, as well as words and phrases that may be inappropriate.  If there are any questions or concerns please feel free to contact the dictating provider for clarification.    YUKI Mejía-NATAN Millard  03/17/25 9483

## 2025-03-18 LAB
C TRACH RRNA SPEC QL NAA+PROBE: NEGATIVE
N GONORRHOEA DNA SPEC QL PROBE+SIG AMP: NEGATIVE

## 2025-03-18 ASSESSMENT — ENCOUNTER SYMPTOMS
HOARSE VOICE: 1
SORE THROAT: 1
TROUBLE SWALLOWING: 1
HEADACHES: 1

## 2025-03-19 ENCOUNTER — OFFICE VISIT (OUTPATIENT)
Dept: OBSTETRICS AND GYNECOLOGY | Facility: CLINIC | Age: 55
End: 2025-03-19
Payer: COMMERCIAL

## 2025-03-19 VITALS — WEIGHT: 223 LBS | BODY MASS INDEX: 38.28 KG/M2 | DIASTOLIC BLOOD PRESSURE: 88 MMHG | SYSTOLIC BLOOD PRESSURE: 124 MMHG

## 2025-03-19 LAB — BACTERIA UR CULT: ABNORMAL

## 2025-03-19 ASSESSMENT — ENCOUNTER SYMPTOMS
ENDOCRINE NEGATIVE: 0
MUSCULOSKELETAL NEGATIVE: 0
RESPIRATORY NEGATIVE: 0
HEADACHES: 1
GASTROINTESTINAL NEGATIVE: 0
EYES NEGATIVE: 0
HEMATOLOGIC/LYMPHATIC NEGATIVE: 0
HOARSE VOICE: 1
CONSTITUTIONAL NEGATIVE: 0
PSYCHIATRIC NEGATIVE: 0
SORE THROAT: 1
ALLERGIC/IMMUNOLOGIC NEGATIVE: 0
NEUROLOGICAL NEGATIVE: 0
TROUBLE SWALLOWING: 1
CARDIOVASCULAR NEGATIVE: 0

## 2025-03-19 ASSESSMENT — PAIN SCALES - GENERAL: PAINLEVEL_OUTOF10: 5

## 2025-03-20 ENCOUNTER — TELEPHONE (OUTPATIENT)
Dept: PHARMACY | Facility: HOSPITAL | Age: 55
End: 2025-03-20
Payer: COMMERCIAL

## 2025-03-20 NOTE — PROGRESS NOTES
"EDPD Note: Rapid Result Review    I reviewed Desire Dailey \"Aixa\" 's chart regarding a positive urine culture/result that was taken during their recent emergency room visit. The patient was told about these results prior to leaving the emergency department. Therefore, patient was contacted and given appropriate education.     Patient presented to the ED for vaginal dryness & irritation. She was discharged on Keflex 500mg QID x 7D. Patient reports no current urinary symptoms or at time of ED presentation. Advised patient to discontinue antibiotic therapy. Patient verbalizes understanding.    Susceptibility data from last 90 days.  Collected Specimen Info Organism Amoxicillin/Clavulanate Ampicillin Ampicillin/Sulbactam Cefazolin Cefazolin (uncomplicated UTIs only) Ciprofloxacin Gentamicin Nitrofurantoin Piperacillin/Tazobactam Trimethoprim/Sulfamethoxazole   03/17/25 Urine from Clean Catch/Voided Escherichia coli  S  R  S  S  S  S  S  S  S  S     Admission on 03/17/2025, Discharged on 03/17/2025   Component Date Value Ref Range Status    Trichomonas 03/17/2025 Present (A)  None Seen Final    Clue Cells 03/17/2025 None Seen  None Seen Final    Yeast 03/17/2025 None Seen  None Seen Final    WBC 03/17/2025 1-2   Final    Neisseria gonorrhea,Amplified 03/17/2025 Negative  Negative Final    Chlamydia trachomatis, Amplified 03/17/2025 Negative  Negative Final    Color, Urine 03/17/2025 Yellow  Light-Yellow, Yellow, Dark-Yellow Final    Appearance, Urine 03/17/2025 Turbid (N)  Clear Final    Specific Gravity, Urine 03/17/2025 1.028  1.005 - 1.035 Final    pH, Urine 03/17/2025 6.0  5.0, 5.5, 6.0, 6.5, 7.0, 7.5, 8.0 Final    Protein, Urine 03/17/2025 30 (1+) (A)  NEGATIVE, 10 (TRACE), 20 (TRACE) mg/dL Final    Glucose, Urine 03/17/2025 Normal  Normal mg/dL Final    Blood, Urine 03/17/2025 NEGATIVE  NEGATIVE mg/dL Final    Ketones, Urine 03/17/2025 NEGATIVE  NEGATIVE mg/dL Final    Bilirubin, Urine 03/17/2025 NEGATIVE  NEGATIVE " mg/dL Final    Urobilinogen, Urine 03/17/2025 Normal  Normal mg/dL Final    Nitrite, Urine 03/17/2025 2+ (A)  NEGATIVE Final    Leukocyte Esterase, Urine 03/17/2025 500 Gina/uL (A)  NEGATIVE Final    Extra Tube 03/17/2025 Hold for add-ons.   Final    Auto resulted.    WBC, Urine 03/17/2025 >50 (A)  1-5, NONE /HPF Final    RBC, Urine 03/17/2025 6-10 (A)  NONE, 1-2, 3-5 /HPF Final    Squamous Epithelial Cells, Urine 03/17/2025 1-9 (SPARSE)  Reference range not established. /HPF Final    Bacteria, Urine 03/17/2025 1+ (A)  NONE SEEN /HPF Final    Mucus, Urine 03/17/2025 FEW  Reference range not established. /LPF Final    Urine Culture 03/17/2025 >=100,000 CFU/mL Escherichia coli (A)   Final       No further follow up needed from EDPD Team.     If there are any other questions for the ED Post-Discharge Culture Follow Up Team, please contact [phone]. Fax: [phone].    Rosita Kraft, NataliaD

## 2025-03-20 NOTE — PROGRESS NOTES
EDPD Note: Antibiotics Reviewed and Warranted    Contacted Mr./Mrs./Ms. Desire Dailey regarding a positive trichomonas culture/result that was taken during their recent emergency room visit. I completed education with patient . The patient is being treated appropriately with Metronidazole 500mg BID x 7D.     Patient presented to the ED for vaginal dryness and irritation. Patient reports that her vagina was burning and felt like razor blades. Patient informed of trichomonas diagnosis. Patient advised to make all sexual partners aware and to remain sexually abstinent for 7D following treatment completion. Patient denied EPT at this time as her partner is currently out of state. Advised patient to continue antibiotic as prescribed.    Susceptibility data from last 90 days.  Collected Specimen Info Organism Amoxicillin/Clavulanate Ampicillin Ampicillin/Sulbactam Cefazolin Cefazolin (uncomplicated UTIs only) Ciprofloxacin Gentamicin Nitrofurantoin Piperacillin/Tazobactam Trimethoprim/Sulfamethoxazole   03/17/25 Urine from Clean Catch/Voided Escherichia coli  S  R  S  S  S  S  S  S  S  S        No further follow up needed from EDPD Team.     Rosita Kraft, NataliaD

## 2025-03-24 ENCOUNTER — APPOINTMENT (OUTPATIENT)
Dept: OBSTETRICS AND GYNECOLOGY | Facility: CLINIC | Age: 55
End: 2025-03-24
Payer: COMMERCIAL

## 2025-03-31 ENCOUNTER — APPOINTMENT (OUTPATIENT)
Facility: CLINIC | Age: 55
End: 2025-03-31
Payer: COMMERCIAL

## 2025-04-07 ENCOUNTER — APPOINTMENT (OUTPATIENT)
Facility: CLINIC | Age: 55
End: 2025-04-07
Payer: COMMERCIAL

## 2025-07-08 ENCOUNTER — APPOINTMENT (OUTPATIENT)
Dept: ENDOCRINOLOGY | Facility: CLINIC | Age: 55
End: 2025-07-08
Payer: COMMERCIAL

## 2025-07-16 ENCOUNTER — APPOINTMENT (OUTPATIENT)
Dept: ENDOCRINOLOGY | Facility: CLINIC | Age: 55
End: 2025-07-16
Payer: COMMERCIAL

## 2026-03-18 ENCOUNTER — APPOINTMENT (OUTPATIENT)
Dept: OPHTHALMOLOGY | Facility: CLINIC | Age: 56
End: 2026-03-18
Payer: COMMERCIAL